# Patient Record
Sex: FEMALE | Race: WHITE | NOT HISPANIC OR LATINO | Employment: OTHER | URBAN - METROPOLITAN AREA
[De-identification: names, ages, dates, MRNs, and addresses within clinical notes are randomized per-mention and may not be internally consistent; named-entity substitution may affect disease eponyms.]

---

## 2017-01-17 ENCOUNTER — TRANSFERRED RECORDS (OUTPATIENT)
Dept: SURGERY | Facility: CLINIC | Age: 62
End: 2017-01-17

## 2017-06-07 ENCOUNTER — TELEPHONE (OUTPATIENT)
Dept: INTERNAL MEDICINE | Facility: CLINIC | Age: 62
End: 2017-06-07

## 2017-06-07 NOTE — TELEPHONE ENCOUNTER
Panel Management Review      Patient has the following on her problem list: None      Composite cancer screening  Chart review shows that this patient is due/due soon for the following Pap Smear and Colonoscopy  Summary:    Patient is due/failing the following:   COLONOSCOPY and PAP    Action needed:   Patient needs office visit for PX w/ PAP.    Type of outreach:    Phone, left message for patient to call back. to set that appt up    Questions for provider review:    None                                                                                                                                    Amy Baird CMA       Chart routed to [place] .

## 2017-07-03 NOTE — TELEPHONE ENCOUNTER
Pt informed due for px-pap and colonoscopy.    States she had pap on 10-18-16 with Dr. Herrera at [facility] and was normal/neg.    Has appt for colonoscopy 8-28-17.

## 2017-08-28 ENCOUNTER — HOSPITAL ENCOUNTER (OUTPATIENT)
Facility: CLINIC | Age: 62
Discharge: HOME | End: 2017-08-28
Attending: COLON & RECTAL SURGERY | Admitting: COLON & RECTAL SURGERY
Payer: COMMERCIAL

## 2017-08-28 ENCOUNTER — TRANSFERRED RECORDS (OUTPATIENT)
Dept: HEALTH INFORMATION MANAGEMENT | Facility: CLINIC | Age: 62
End: 2017-08-28

## 2017-08-28 VITALS
DIASTOLIC BLOOD PRESSURE: 73 MMHG | SYSTOLIC BLOOD PRESSURE: 115 MMHG | OXYGEN SATURATION: 95 % | WEIGHT: 130 LBS | HEIGHT: 62 IN | BODY MASS INDEX: 23.92 KG/M2 | RESPIRATION RATE: 10 BRPM

## 2017-08-28 DIAGNOSIS — Z12.11 SPECIAL SCREENING FOR MALIGNANT NEOPLASMS, COLON: Primary | ICD-10-CM

## 2017-08-28 LAB — COLONOSCOPY: NORMAL

## 2017-08-28 PROCEDURE — 25000128 H RX IP 250 OP 636: Performed by: COLON & RECTAL SURGERY

## 2017-08-28 PROCEDURE — 88305 TISSUE EXAM BY PATHOLOGIST: CPT | Mod: 26 | Performed by: COLON & RECTAL SURGERY

## 2017-08-28 PROCEDURE — G0500 MOD SEDAT ENDO SERVICE >5YRS: HCPCS | Performed by: COLON & RECTAL SURGERY

## 2017-08-28 PROCEDURE — 99153 MOD SED SAME PHYS/QHP EA: CPT | Performed by: COLON & RECTAL SURGERY

## 2017-08-28 PROCEDURE — 88305 TISSUE EXAM BY PATHOLOGIST: CPT | Performed by: COLON & RECTAL SURGERY

## 2017-08-28 PROCEDURE — 45380 COLONOSCOPY AND BIOPSY: CPT | Performed by: COLON & RECTAL SURGERY

## 2017-08-28 RX ORDER — FLUMAZENIL 0.1 MG/ML
0.2 INJECTION, SOLUTION INTRAVENOUS
Status: DISCONTINUED | OUTPATIENT
Start: 2017-08-28 | End: 2017-08-28 | Stop reason: HOSPADM

## 2017-08-28 RX ORDER — FENTANYL CITRATE 50 UG/ML
INJECTION, SOLUTION INTRAMUSCULAR; INTRAVENOUS PRN
Status: DISCONTINUED | OUTPATIENT
Start: 2017-08-28 | End: 2017-08-28 | Stop reason: HOSPADM

## 2017-08-28 RX ORDER — ONDANSETRON 4 MG/1
4-8 TABLET, ORALLY DISINTEGRATING ORAL EVERY 8 HOURS PRN
Qty: 10 TABLET | Refills: 0 | Status: SHIPPED | OUTPATIENT
Start: 2017-08-28 | End: 2020-11-19

## 2017-08-28 RX ORDER — LIDOCAINE 40 MG/G
CREAM TOPICAL
Status: DISCONTINUED | OUTPATIENT
Start: 2017-08-28 | End: 2017-08-28 | Stop reason: HOSPADM

## 2017-08-28 RX ORDER — ONDANSETRON 2 MG/ML
INJECTION INTRAMUSCULAR; INTRAVENOUS PRN
Status: DISCONTINUED | OUTPATIENT
Start: 2017-08-28 | End: 2017-08-28 | Stop reason: HOSPADM

## 2017-08-28 RX ORDER — NALOXONE HYDROCHLORIDE 0.4 MG/ML
.1-.4 INJECTION, SOLUTION INTRAMUSCULAR; INTRAVENOUS; SUBCUTANEOUS
Status: DISCONTINUED | OUTPATIENT
Start: 2017-08-28 | End: 2017-08-28 | Stop reason: HOSPADM

## 2017-08-28 RX ORDER — ONDANSETRON 2 MG/ML
4 INJECTION INTRAMUSCULAR; INTRAVENOUS
Status: DISCONTINUED | OUTPATIENT
Start: 2017-08-28 | End: 2017-08-28 | Stop reason: HOSPADM

## 2017-08-28 RX ORDER — ONDANSETRON 4 MG/1
4 TABLET, ORALLY DISINTEGRATING ORAL EVERY 6 HOURS PRN
Status: DISCONTINUED | OUTPATIENT
Start: 2017-08-28 | End: 2017-08-28 | Stop reason: HOSPADM

## 2017-08-28 RX ORDER — ONDANSETRON 2 MG/ML
4 INJECTION INTRAMUSCULAR; INTRAVENOUS EVERY 6 HOURS PRN
Status: DISCONTINUED | OUTPATIENT
Start: 2017-08-28 | End: 2017-08-28 | Stop reason: HOSPADM

## 2017-08-28 NOTE — OP NOTE
See Provation Note In Chart    Tiera Vazquez MD  [facility]  Office Phone # [phone]

## 2017-08-28 NOTE — H&P
Pre-Endoscopy History and Physical     Lorena Sanchez MRN# [id number]   YOB: 1955 Age: 61 year old     Date of Procedure: 8/28/2017  Primary care provider: Azalia Herrera  Type of Endoscopy: colonoscopy  Reason for Procedure: screening    Type of Anesthesia Anticipated: Moderate Sedation    HPI:    Lorena is a 61 year old female who will be undergoing the above procedure.      A history and physical has been performed. The patient's medications and allergies have been reviewed. The risks and benefits of the procedure and the sedation options and risks were discussed with the patient.  All questions were answered and informed consent was obtained.      She denies a personal or family history of anesthesia complications or bleeding disorders.     Allergies   Allergen Reactions     Penicillins Nausea and Vomiting     Sulfa Drugs Hives        Prior to Admission Medications   Prescriptions Last Dose Informant Patient Reported? Taking?   calcium carbonate (OS-JESUS 500 MG [place]. [place]) 500 MG tablet 8/27/2017  Yes Yes   Sig: Take 500 mg by mouth 2 times daily   multivitamin, therapeutic with minerals (MULTI-VITAMIN) TABS Past Week  Yes Yes   Sig: Take 1 tablet by mouth daily   valACYclovir (VALTREX) 1000 mg tablet More than a month  No No   Sig: Take 1 tablet (1,000 mg) by mouth 3 times daily   venlafaxine (EFFEXOR-XR) 75 MG 24 hr capsule 8/27/2017  Yes Yes   Sig: Take 75 mg by mouth daily      Facility-Administered Medications: None       Patient Active Problem List   Diagnosis     DCIS (ductal carcinoma in situ)        Past Medical History:   Diagnosis Date     DCIS (ductal carcinoma in situ) 03/12    lt breast      PONV (postoperative nausea and vomiting)         Past Surgical History:   Procedure Laterality Date     BIOPSY BREAST NEEDLE LOCALIZATION  3/16/2012    Procedure:BIOPSY BREAST NEEDLE LOCALIZATION; Left Breast Wire Localized Excisional biopsy; Surgeon:BEN RAWLS; Location:[facility]     BREAST BIOPSY,  "RT/LT      left     LUMPECTOMY BREAST Left        Social History   Substance Use Topics     Smoking status: Never Smoker     Smokeless tobacco: Never Used     Alcohol use No       Family History   Problem Relation Age of Onset     DIABETES Mother      Heart Failure Father      Parkinsonism Maternal Grandmother      Colon Cancer No family hx of        REVIEW OF SYSTEMS:     5 point ROS negative except as noted above in HPI, including Gen., Resp., CV, GI &  system review.      PHYSICAL EXAM:   /77  Resp 16  Ht 1.575 m (5' 2\")  Wt 59 kg (130 lb)  SpO2 97%  BMI 23.78 kg/m2 Estimated body mass index is 23.78 kg/(m^2) as calculated from the following:    Height as of this encounter: 1.575 m (5' 2\").    Weight as of this encounter: 59 kg (130 lb).   GENERAL APPEARANCE: healthy and alert  MENTAL STATUS: alert  AIRWAY EXAM: Mallampatti Class II (visualization of the soft palate, fauces, and uvula)  RESP: lungs clear to auscultation - no rales, rhonchi or wheezes  CV: regular rates and rhythm      DIAGNOSTICS:    Not indicated      IMPRESSION   ASA Class 2 - Mild systemic disease        PLAN:       Plan for colonoscopy. We discussed the risks, benefits and alternatives and the patient wished to proceed.    The above has been forwarded to the consulting provider.      Signed Electronically by: Tiera Vazquez MD  August 28, 2017    "

## 2017-08-29 LAB — COPATH REPORT: NORMAL

## 2017-09-15 ENCOUNTER — ALLIED HEALTH/NURSE VISIT (OUTPATIENT)
Dept: NURSING | Facility: CLINIC | Age: 62
End: 2017-09-15
Payer: COMMERCIAL

## 2017-09-15 DIAGNOSIS — Z23 ENCOUNTER FOR IMMUNIZATION: Primary | ICD-10-CM

## 2017-09-15 PROCEDURE — 90471 IMMUNIZATION ADMIN: CPT

## 2017-09-15 PROCEDURE — 99207 ZZC NO CHARGE NURSE ONLY: CPT

## 2017-09-15 PROCEDURE — 90736 HZV VACCINE LIVE SUBQ: CPT

## 2017-09-15 NOTE — MR AVS SNAPSHOT
"              After Visit Summary   9/15/2017    Lorena Sanchez    MRN: [id number]           Patient Information     Date Of Birth          1955        Visit Information        Provider Department      9/15/2017 3:30 PM RI IM NURSE [facility]        Today's Diagnoses     Encounter for immunization    -  1       Follow-ups after your visit        Who to contact     If you have questions or need follow up information about today's clinic visit or your schedule please contact [facility] directly at [phone].  Normal or non-critical lab and imaging results will be communicated to you by [employer], letter or phone within 4 business days after the clinic has received the results. If you do not hear from us within 7 days, please contact the clinic through [employer] or phone. If you have a critical or abnormal lab result, we will notify you by phone as soon as possible.  Submit refill requests through [employer] or call your pharmacy and they will forward the refill request to us. Please allow 3 business days for your refill to be completed.          Additional Information About Your Visit        MyChart Information     [employer] lets you send messages to your doctor, view your test results, renew your prescriptions, schedule appointments and more. To sign up, go to [email or web address] . Click on \"Log in\" on the left side of the screen, which will take you to the Welcome page. Then click on \"Sign up Now\" on the right side of the page.     You will be asked to enter the access code listed below, as well as some personal information. Please follow the directions to create your username and password.     Your access code is: [email or web address]  Expires: 2017  4:29 PM     Your access code will  in 90 days. If you need help or a new code, please call your [facility] or [phone].        Care EveryWhere ID     This is your Care EveryWhere ID. This could be used by other " organizations to access your [facility] medical records  [id number]         Blood Pressure from Last 3 Encounters:   08/28/17 115/73   08/15/16 118/70   12/07/15 114/78    Weight from Last 3 Encounters:   08/28/17 130 lb (59 kg)   08/15/16 135 lb (61.2 kg)   12/07/15 131 lb 1.6 oz (59.5 kg)              We Performed the Following     ZOSTER VACC LIVE SUBQ NJX        Primary Care Provider Office Phone # Fax #    Azalia JONATHAN Herrera [phone]       [facility] [address]  [place] [address]        Equal Access to Services     [facility]: Hadii aad nubia hadreeceo Socosme, waaxda luqadaha, qaybta kaalmada adeegyada, brianne philippe . So [facility] [phone].    ATENCIÓN: Si habla español, tiene a redding disposición servicios gratuitos de asistencia lingüística. Llame al [phone].    We comply with applicable federal civil rights laws and [place] laws. We do not discriminate on the basis of race, color, national origin, age, disability sex, sexual orientation or gender identity.            Thank you!     Thank you for choosing [facility]  for your care. Our goal is always to provide you with excellent care. Hearing back from our patients is one way we can continue to improve our services. Please take a few minutes to complete the written survey that you may receive in the mail after your visit with us. Thank you!             Your Updated Medication List - Protect others around you: Learn how to safely use, store and throw away your medicines at www.disposemymeds.org.          This list is accurate as of: 9/15/17  4:29 PM.  Always use your most recent med list.                   Brand Name Dispense Instructions for use Diagnosis    calcium carbonate 1250 MG tablet    OS-JESUS 500 mg [place]. [place]     Take 500 mg by mouth 2 times daily        Multi-vitamin Tabs tablet      Take 1 tablet by mouth daily        ondansetron 4 MG ODT tab    ZOFRAN ODT    10 tablet     Take 1-2 tablets (4-8 mg) by mouth every 8 hours as needed for nausea    Special screening for malignant neoplasms, colon       valACYclovir 1000 mg tablet    VALTREX    21 tablet    Take 1 tablet (1,000 mg) by mouth 3 times daily    Herpes zoster without complication       venlafaxine 75 MG 24 hr capsule    EFFEXOR-XR     Take 75 mg by mouth daily

## 2017-09-15 NOTE — NURSING NOTE
Screening Questionnaire for Adult Immunization    Are you sick today?   No   Do you have allergies to medications, food, a vaccine component or latex?   No   Have you ever had a serious reaction after receiving a vaccination?   No   Do you have a long-term health problem with heart disease, lung disease, asthma, kidney disease, metabolic disease (e.g. diabetes), anemia, or other blood disorder?   No   Do you have cancer, leukemia, HIV/AIDS, or any other immune system problem?   No   In the past 3 months, have you taken medications that affect  your immune system, such as prednisone, other steroids, or anticancer drugs; drugs for the treatment of rheumatoid arthritis, Crohn s disease, or psoriasis; or have you had radiation treatments?   No   Have you had a seizure, or a brain or other nervous system problem?   No   During the past year, have you received a transfusion of blood or blood     products, or been given immune (gamma) globulin or antiviral drug?   No   For women: Are you pregnant or is there a chance you could become        pregnant during the next month?   No   Have you received any vaccinations in the past 4 weeks?   No     Immunization questionnaire answers were all negative.        Per orders of Dr. Mcdowell, injection of Zostivax   given by Sharmila Parsons. Patient instructed to remain in clinic for 15 minutes afterwards, and to report any adverse reaction to me immediately.       Screening performed by Sharmila Parsons on 9/15/2017 at 4:27 PM.

## 2017-12-07 ENCOUNTER — HOSPITAL ENCOUNTER (OUTPATIENT)
Dept: MAMMOGRAPHY | Facility: CLINIC | Age: 62
Discharge: HOME | End: 2017-12-07
Attending: OBSTETRICS & GYNECOLOGY | Admitting: OBSTETRICS & GYNECOLOGY
Payer: COMMERCIAL

## 2017-12-07 ENCOUNTER — HOSPITAL ENCOUNTER (OUTPATIENT)
Dept: ULTRASOUND IMAGING | Facility: CLINIC | Age: 62
End: 2017-12-07
Attending: OBSTETRICS & GYNECOLOGY
Payer: COMMERCIAL

## 2017-12-07 DIAGNOSIS — N64.52 BREAST DISCHARGE: ICD-10-CM

## 2017-12-07 PROCEDURE — G0204 DX MAMMO INCL CAD BI: HCPCS

## 2017-12-07 PROCEDURE — 76642 ULTRASOUND BREAST LIMITED: CPT | Mod: RT

## 2017-12-20 ENCOUNTER — HOSPITAL ENCOUNTER (OUTPATIENT)
Dept: MRI IMAGING | Facility: CLINIC | Age: 62
Discharge: HOME | End: 2017-12-20
Attending: OBSTETRICS & GYNECOLOGY | Admitting: OBSTETRICS & GYNECOLOGY
Payer: COMMERCIAL

## 2017-12-20 DIAGNOSIS — R92.8 ABNORMAL MAMMOGRAM: ICD-10-CM

## 2017-12-20 PROCEDURE — A9585 GADOBUTROL INJECTION: HCPCS | Performed by: OBSTETRICS & GYNECOLOGY

## 2017-12-20 PROCEDURE — 0159T MR BREAST BILATERAL W/O & W CONTRAST: CPT

## 2017-12-20 PROCEDURE — 25000128 H RX IP 250 OP 636: Performed by: OBSTETRICS & GYNECOLOGY

## 2017-12-20 RX ORDER — GADOBUTROL 604.72 MG/ML
7.5 INJECTION INTRAVENOUS ONCE
Status: COMPLETED | OUTPATIENT
Start: 2017-12-20 | End: 2017-12-20

## 2017-12-20 RX ADMIN — GADOBUTROL 6 ML: 604.72 INJECTION INTRAVENOUS at 14:01

## 2017-12-21 ENCOUNTER — TELEPHONE (OUTPATIENT)
Dept: MAMMOGRAPHY | Facility: CLINIC | Age: 62
End: 2017-12-21

## 2017-12-21 NOTE — TELEPHONE ENCOUNTER
Patient notified of within normal limits breast MRI.  Instructed patient to have annual screening mammogram. Instructed patient to continue to monitor for nipple discharge, but not to try to express.  If she notices spontaneous discharge she should call navigator back and I will help her get an appointment to see a surgeon.  Patient states she has had no discharge for over 1 month and she will continue to monitor.  Informed her to speak with her PCP regarding future breast MRIs.

## 2018-10-05 ENCOUNTER — HOSPITAL ENCOUNTER (OUTPATIENT)
Dept: MAMMOGRAPHY | Facility: CLINIC | Age: 63
Discharge: HOME | End: 2018-10-05
Attending: OBSTETRICS & GYNECOLOGY | Admitting: OBSTETRICS & GYNECOLOGY
Payer: COMMERCIAL

## 2018-10-05 ENCOUNTER — HOSPITAL ENCOUNTER (OUTPATIENT)
Dept: ULTRASOUND IMAGING | Facility: CLINIC | Age: 63
End: 2018-10-05
Attending: OBSTETRICS & GYNECOLOGY
Payer: COMMERCIAL

## 2018-10-05 DIAGNOSIS — N63.20 LEFT BREAST LUMP: ICD-10-CM

## 2018-10-05 PROCEDURE — 77066 DX MAMMO INCL CAD BI: CPT

## 2018-10-05 PROCEDURE — 76642 ULTRASOUND BREAST LIMITED: CPT | Mod: LT

## 2019-03-25 LAB — PAP SMEAR - HIM PATIENT REPORTED: NEGATIVE

## 2019-10-11 ENCOUNTER — HOSPITAL ENCOUNTER (OUTPATIENT)
Dept: MAMMOGRAPHY | Facility: CLINIC | Age: 64
Discharge: HOME | End: 2019-10-11
Attending: OBSTETRICS & GYNECOLOGY | Admitting: OBSTETRICS & GYNECOLOGY
Payer: COMMERCIAL

## 2019-10-11 DIAGNOSIS — Z12.31 VISIT FOR SCREENING MAMMOGRAM: ICD-10-CM

## 2019-10-11 DIAGNOSIS — Z85.3 HISTORY OF BREAST CANCER: ICD-10-CM

## 2019-10-11 PROCEDURE — 77063 BREAST TOMOSYNTHESIS BI: CPT

## 2020-10-07 ENCOUNTER — TRANSFERRED RECORDS (OUTPATIENT)
Dept: FAMILY MEDICINE | Facility: CLINIC | Age: 65
End: 2020-10-07

## 2020-10-07 LAB
CHOLEST SERPL-MCNC: 233 MG/DL (ref 100–199)
CHOLEST/HDLC SERPL: 2.2 {RATIO} (ref 0–3.2)
HBA1C MFR BLD: 6 % (ref 4.8–5.6)
HDLC SERPL-MCNC: 52 MG/DL
LDLC SERPL CALC-MCNC: 116 MG/DL (ref 0–99)
T4 FREE SERPL-MCNC: 0.92 NG/DL (ref 0.82–1.77)
TRIGL SERPL-MCNC: 375 MG/DL (ref 0–149)
TSH SERPL-ACNC: 4.85 MCU/ML (ref 0.45–4.5)

## 2020-10-13 ENCOUNTER — HOSPITAL ENCOUNTER (OUTPATIENT)
Dept: ULTRASOUND IMAGING | Facility: CLINIC | Age: 65
End: 2020-10-13
Attending: OBSTETRICS & GYNECOLOGY
Payer: COMMERCIAL

## 2020-10-13 ENCOUNTER — HOSPITAL ENCOUNTER (OUTPATIENT)
Dept: MAMMOGRAPHY | Facility: CLINIC | Age: 65
End: 2020-10-13
Attending: OBSTETRICS & GYNECOLOGY
Payer: COMMERCIAL

## 2020-10-13 DIAGNOSIS — N64.52 NIPPLE DISCHARGE: ICD-10-CM

## 2020-10-13 DIAGNOSIS — Z11.59 ENCOUNTER FOR SCREENING FOR OTHER VIRAL DISEASES: Primary | ICD-10-CM

## 2020-10-13 PROCEDURE — 76642 ULTRASOUND BREAST LIMITED: CPT | Mod: RT

## 2020-10-13 PROCEDURE — G0279 TOMOSYNTHESIS, MAMMO: HCPCS

## 2020-10-30 DIAGNOSIS — Z11.59 ENCOUNTER FOR SCREENING FOR OTHER VIRAL DISEASES: ICD-10-CM

## 2020-10-30 PROCEDURE — U0003 INFECTIOUS AGENT DETECTION BY NUCLEIC ACID (DNA OR RNA); SEVERE ACUTE RESPIRATORY SYNDROME CORONAVIRUS 2 (SARS-COV-2) (CORONAVIRUS DISEASE [COVID-19]), AMPLIFIED PROBE TECHNIQUE, MAKING USE OF HIGH THROUGHPUT TECHNOLOGIES AS DESCRIBED BY CMS-2020-01-R: HCPCS | Performed by: OBSTETRICS & GYNECOLOGY

## 2020-10-31 LAB
SARS-COV-2 RNA SPEC QL NAA+PROBE: NOT DETECTED
SPECIMEN SOURCE: NORMAL

## 2020-11-03 ENCOUNTER — HOSPITAL ENCOUNTER (OUTPATIENT)
Dept: ULTRASOUND IMAGING | Facility: CLINIC | Age: 65
End: 2020-11-03
Attending: OBSTETRICS & GYNECOLOGY
Payer: MEDICARE

## 2020-11-03 ENCOUNTER — HOSPITAL ENCOUNTER (OUTPATIENT)
Dept: MAMMOGRAPHY | Facility: CLINIC | Age: 65
End: 2020-11-03
Attending: OBSTETRICS & GYNECOLOGY
Payer: MEDICARE

## 2020-11-03 DIAGNOSIS — N64.52 NIPPLE DISCHARGE: ICD-10-CM

## 2020-11-03 PROCEDURE — 272N000031 US BREAST BIOPSY CORE NEEDLE RIGHT

## 2020-11-03 PROCEDURE — 999N000065 MA POST PROCEDURE RIGHT

## 2020-11-03 PROCEDURE — 88305 TISSUE EXAM BY PATHOLOGIST: CPT | Mod: 26 | Performed by: PATHOLOGY

## 2020-11-03 PROCEDURE — 250N000009 HC RX 250: Performed by: RADIOLOGY

## 2020-11-03 PROCEDURE — 88305 TISSUE EXAM BY PATHOLOGIST: CPT | Mod: TC | Performed by: OBSTETRICS & GYNECOLOGY

## 2020-11-03 RX ORDER — INFLUENZA VIRUS VACCINE 15; 15; 15; 15 UG/.5ML; UG/.5ML; UG/.5ML; UG/.5ML
SUSPENSION INTRAMUSCULAR
COMMUNITY
Start: 2020-10-13 | End: 2020-11-13

## 2020-11-03 RX ORDER — FLUTICASONE PROPIONATE 50 MCG
2 SPRAY, SUSPENSION (ML) NASAL
COMMUNITY
Start: 2019-05-20 | End: 2020-11-13

## 2020-11-03 RX ORDER — CETIRIZINE HYDROCHLORIDE 10 MG/1
10 TABLET ORAL
COMMUNITY
Start: 2019-05-20

## 2020-11-03 RX ADMIN — LIDOCAINE HYDROCHLORIDE 5 ML: 10 INJECTION, SOLUTION EPIDURAL; INFILTRATION; INTRACAUDAL; PERINEURAL at 08:17

## 2020-11-03 NOTE — DISCHARGE INSTRUCTIONS
Page 1 of 1  For informational purposes only. Not to replace the advice of your health care provider. Copyright   2010 [facility]. All rights reserved. Isai [id number] - REV 02/16.  After Your Breast Biopsy   Bleeding or bruising  Slight bruising is normal. If you bleed through the bandage, put direct pressure on the breast for 10 minutes.   If the breast begins to swell, or you have a lot of bleeding after 10 minutes of pressure, call the doctor who ordered your exam. Or, go to the emergency room.   Bandages  Keep your bandage in place until tomorrow morning. Do not get it wet.   If you have small pieces of tape on the skin, leave them in place. They will fall off on their own, or you can remove them after 5 days.   Activity  You may shower the morning after the exam. No heavy activity (lifting, vacuuming) on the day of your exam. You may go back to normal activity the next day, unless you had a lot of bleeding or pain.  Discomfort  You may take Tylenol (acetaminophen) today for pain. Tomorrow, you may take an anti-inflammatory medicine (aspirin, ibuprofen, Motrin, Aleve, Advil), unless your doctor tells you not to.  Wear your bra overnight to support the breast. You may also use an ice pack: Place it over the area for 15-20 minutes several times a day.  Infection  Infection is rare. Symptoms include fever, redness, increasing pain and fluid draining from the biopsy site. If you have any of these symptoms, please call the doctor who ordered your exam.  Results  Results may take up to 5 business days. A nurse or doctor from the Breast Center will call with your results. We will also send the results to the doctor who ordered your biopsy.  If you have not heard your results in 5 days, please call the Breast Center.   Other instructions  ______________________________________________________________________________________________________________________________  Call your doctor if:    You have  bleeding that lasts more than 10 minutes.    You have pain that cannot be controlled.   You have signs of infection (fever, redness, drainage or other signs).   You have not received your results within 5 days.    Please call the Breast Center nurse navigator at [phone] if you have questions or concerns about your biopsy.

## 2020-11-04 ENCOUNTER — TELEPHONE (OUTPATIENT)
Dept: MAMMOGRAPHY | Facility: CLINIC | Age: 65
End: 2020-11-04

## 2020-11-04 LAB — COPATH REPORT: NORMAL

## 2020-11-04 NOTE — TELEPHONE ENCOUNTER
Pathology report reviewed with our breast radiologist Dr Damon, who confirmed the recent breast imaging is concordant with the final pathology results below.    I phoned Ms Sanchez, confirmed her full name, date of birth, and informed patient of her ultrasound Guided right Breast Needle Biopsy (11/03/2020) results showing benign Nonproliferative appearing breast tissue without specific lesion.   - Negative for atypia and malignancy.   - See microscopic description.      Recommended follow up is stereotactic right breast biopsy. I have explained this procedure and that I will contact Dr Herrera for additional orders. She will then be contacted by scheduling department. I have also provided that phone number in the event she chooses to call them directly.      Patient states no problems or concerns with her biopsy site.   Questions were answered and my phone number given if she has further questions or concerns.  I informed patient I will notify the ordering provider of the results and recommendations for follow up.  Patient verbalized understanding and agrees with the plan of care.     Neva Ni RN CBCN  Breast Care Nurse Coordinator  [facility]  [phone]

## 2020-11-06 DIAGNOSIS — R92.1 BREAST CALCIFICATIONS: ICD-10-CM

## 2020-11-06 DIAGNOSIS — Z11.59 ENCOUNTER FOR SCREENING FOR OTHER VIRAL DISEASES: Primary | ICD-10-CM

## 2020-11-10 DIAGNOSIS — Z11.59 ENCOUNTER FOR SCREENING FOR OTHER VIRAL DISEASES: ICD-10-CM

## 2020-11-10 PROCEDURE — U0003 INFECTIOUS AGENT DETECTION BY NUCLEIC ACID (DNA OR RNA); SEVERE ACUTE RESPIRATORY SYNDROME CORONAVIRUS 2 (SARS-COV-2) (CORONAVIRUS DISEASE [COVID-19]), AMPLIFIED PROBE TECHNIQUE, MAKING USE OF HIGH THROUGHPUT TECHNOLOGIES AS DESCRIBED BY CMS-2020-01-R: HCPCS | Performed by: OBSTETRICS & GYNECOLOGY

## 2020-11-11 LAB
SARS-COV-2 RNA SPEC QL NAA+PROBE: NOT DETECTED
SPECIMEN SOURCE: NORMAL

## 2020-11-13 ENCOUNTER — HOSPITAL ENCOUNTER (OUTPATIENT)
Dept: MAMMOGRAPHY | Facility: CLINIC | Age: 65
End: 2020-11-13
Attending: OBSTETRICS & GYNECOLOGY
Payer: MEDICARE

## 2020-11-13 ENCOUNTER — HOSPITAL ENCOUNTER (OUTPATIENT)
Dept: ULTRASOUND IMAGING | Facility: CLINIC | Age: 65
End: 2020-11-13
Attending: OBSTETRICS & GYNECOLOGY
Payer: MEDICARE

## 2020-11-13 DIAGNOSIS — R92.1 BREAST CALCIFICATIONS: ICD-10-CM

## 2020-11-13 PROCEDURE — 76642 ULTRASOUND BREAST LIMITED: CPT | Mod: RT

## 2020-11-13 PROCEDURE — 19081 BX BREAST 1ST LESION STRTCTC: CPT | Mod: RT

## 2020-11-13 PROCEDURE — 250N000009 HC RX 250: Performed by: OBSTETRICS & GYNECOLOGY

## 2020-11-13 PROCEDURE — 19081 BX BREAST 1ST LESION STRTCTC: CPT | Mod: 73

## 2020-11-13 PROCEDURE — 88305 TISSUE EXAM BY PATHOLOGIST: CPT | Mod: 26 | Performed by: PATHOLOGY

## 2020-11-13 PROCEDURE — 999N000065 MA POST PROCEDURE RIGHT

## 2020-11-13 PROCEDURE — 88305 TISSUE EXAM BY PATHOLOGIST: CPT | Mod: TC | Performed by: OBSTETRICS & GYNECOLOGY

## 2020-11-13 RX ORDER — LIDOCAINE HYDROCHLORIDE AND EPINEPHRINE 10; 10 MG/ML; UG/ML
1 INJECTION, SOLUTION INFILTRATION; PERINEURAL ONCE
Status: DISCONTINUED | OUTPATIENT
Start: 2020-11-13 | End: 2020-11-14 | Stop reason: HOSPADM

## 2020-11-13 RX ORDER — LEVOCETIRIZINE DIHYDROCHLORIDE 5 MG/1
5 TABLET, FILM COATED ORAL
COMMUNITY
Start: 2019-05-20 | End: 2020-11-19

## 2020-11-13 RX ORDER — MOMETASONE FUROATE MONOHYDRATE 50 UG/1
2 SPRAY, METERED NASAL
COMMUNITY
Start: 2019-05-20

## 2020-11-13 RX ORDER — LIDOCAINE HYDROCHLORIDE AND EPINEPHRINE 10; 10 MG/ML; UG/ML
20 INJECTION, SOLUTION INFILTRATION; PERINEURAL ONCE
Status: COMPLETED | OUTPATIENT
Start: 2020-11-13 | End: 2020-11-13

## 2020-11-13 RX ORDER — LIDOCAINE HYDROCHLORIDE 10 MG/ML
1 INJECTION, SOLUTION EPIDURAL; INFILTRATION; INTRACAUDAL; PERINEURAL ONCE
Status: DISCONTINUED | OUTPATIENT
Start: 2020-11-13 | End: 2020-11-14 | Stop reason: HOSPADM

## 2020-11-13 RX ORDER — VENLAFAXINE HYDROCHLORIDE 75 MG/1
CAPSULE, EXTENDED RELEASE ORAL
COMMUNITY
Start: 2019-05-14 | End: 2021-02-05 | Stop reason: DRUGHIGH

## 2020-11-13 RX ORDER — OFLOXACIN 3 MG/ML
1-2 SOLUTION/ DROPS OPHTHALMIC
COMMUNITY
Start: 2019-06-28 | End: 2020-11-19

## 2020-11-13 RX ADMIN — LIDOCAINE HYDROCHLORIDE AND EPINEPHRINE 20 ML: 10; 10 INJECTION, SOLUTION INFILTRATION; PERINEURAL at 13:17

## 2020-11-13 RX ADMIN — LIDOCAINE HYDROCHLORIDE 5 ML: 10 INJECTION, SOLUTION INFILTRATION; PERINEURAL at 13:17

## 2020-11-13 NOTE — PROGRESS NOTES
Due to positioning patient will go to [facility] [place] for up right stereotatic breast biopsy. This has been scheduled for later today.

## 2020-11-13 NOTE — DISCHARGE INSTRUCTIONS

## 2020-11-16 ENCOUNTER — TELEPHONE (OUTPATIENT)
Dept: MAMMOGRAPHY | Facility: CLINIC | Age: 65
End: 2020-11-16

## 2020-11-16 LAB — COPATH REPORT: NORMAL

## 2020-11-16 NOTE — TELEPHONE ENCOUNTER
After review by Breast Center Radiologist, Dr. Wilder Damon, Ms. Sanchez was called and given her 11/13/20 Right Breast Biopsy results (Sclerotic Fibroadenoma) and recommended Follow up (Annual Screen).  Biopsy site without issues or concerns.   I encouraged her to contact her doctor with any further breast concerns.

## 2020-11-19 ENCOUNTER — OFFICE VISIT (OUTPATIENT)
Dept: FAMILY MEDICINE | Facility: CLINIC | Age: 65
End: 2020-11-19

## 2020-11-19 VITALS
HEART RATE: 80 BPM | HEIGHT: 61 IN | WEIGHT: 139.2 LBS | TEMPERATURE: 98.1 F | DIASTOLIC BLOOD PRESSURE: 76 MMHG | BODY MASS INDEX: 26.28 KG/M2 | SYSTOLIC BLOOD PRESSURE: 120 MMHG | OXYGEN SATURATION: 94 %

## 2020-11-19 DIAGNOSIS — R79.89 ELEVATED LFTS: ICD-10-CM

## 2020-11-19 DIAGNOSIS — E03.8 SUBCLINICAL HYPOTHYROIDISM: ICD-10-CM

## 2020-11-19 DIAGNOSIS — Z86.000 HISTORY OF DUCTAL CARCINOMA IN SITU (DCIS) OF BREAST: ICD-10-CM

## 2020-11-19 DIAGNOSIS — Z00.00 MEDICARE WELCOME VISIT: Primary | ICD-10-CM

## 2020-11-19 PROBLEM — Z76.89 HEALTH CARE HOME: Status: ACTIVE | Noted: 2020-11-19

## 2020-11-19 PROBLEM — J30.2 ALLERGIC RHINITIS, SEASONAL: Status: ACTIVE | Noted: 2020-11-19

## 2020-11-19 PROBLEM — Z71.89 ACP (ADVANCE CARE PLANNING): Status: ACTIVE | Noted: 2020-11-19

## 2020-11-19 LAB
ERYTHROCYTE [DISTWIDTH] IN BLOOD BY AUTOMATED COUNT: 13.2 %
HCT VFR BLD AUTO: 43.4 % (ref 35–47)
HEMOGLOBIN: 14.3 G/DL (ref 11.7–15.7)
MCH RBC QN AUTO: 29.1 PG (ref 26–33)
MCHC RBC AUTO-ENTMCNC: 32.9 G/DL (ref 31–36)
MCV RBC AUTO: 88.2 FL (ref 78–100)
PLATELET COUNT - QUEST: 264 10^9/L (ref 150–375)
RBC # BLD AUTO: 4.92 10*12/L (ref 3.8–5.2)
WBC # BLD AUTO: 7.4 10*9/L (ref 4–11)

## 2020-11-19 PROCEDURE — 80076 HEPATIC FUNCTION PANEL: CPT | Performed by: PHYSICIAN ASSISTANT

## 2020-11-19 PROCEDURE — 90471 IMMUNIZATION ADMIN: CPT | Performed by: PHYSICIAN ASSISTANT

## 2020-11-19 PROCEDURE — 85027 COMPLETE CBC AUTOMATED: CPT | Performed by: PHYSICIAN ASSISTANT

## 2020-11-19 PROCEDURE — G0402 INITIAL PREVENTIVE EXAM: HCPCS | Mod: 25 | Performed by: PHYSICIAN ASSISTANT

## 2020-11-19 PROCEDURE — 90732 PPSV23 VACC 2 YRS+ SUBQ/IM: CPT | Performed by: PHYSICIAN ASSISTANT

## 2020-11-19 PROCEDURE — 36415 COLL VENOUS BLD VENIPUNCTURE: CPT | Performed by: PHYSICIAN ASSISTANT

## 2020-11-19 ASSESSMENT — MIFFLIN-ST. JEOR: SCORE: 1117.75

## 2020-11-19 NOTE — LETTER
[facility]  [address]  [address]  [facility] [address]  [phone]      November 19, 2020      Lorena Sanchez  [address] [facility] [address]      EMERGENCY CARE PLAN  Presenting Problem Treatment Plan   Questions or concerns during clinic hours I will call the clinic directly:    [facility]  [address] , [address]  [place] [address] [phone]   Questions or concerns outside clinic hours  I will call the 24 hour line at [phone]   Patient needs to schedule an appointment  I will call the  scheduling line at [phone]   Same day treatment   I will call the clinic first, then  urgent care and/or  express care if needed   Clinic Care Coordinators Sol Messer RN:  [phone]  [facility] Clinical Support Staff: [phone]    Crisis Services:  Behavioral or Mental Health P (Behavioral Health Providers)   [phone]   Emergency treatment--Immediately CALL 211

## 2020-11-19 NOTE — PROGRESS NOTES
Lorena Sanchez is a 65 year old female who presents for Medicare Annual Wellness Visit.    Current providers caring for this patient include:  Patient Care Team:  Kerry Marti PA as PCP - General (Family Practice)    Complete Medical and Social history reviewed with patient, outlined below.          Patient Active Problem List   Diagnosis     DCIS (ductal carcinoma in situ)     Allergic rhinitis, seasonal     Breast cancer, left breast (H)     ACP (advance care planning)     Health Care Home       Past Medical History:   Diagnosis Date     DCIS (ductal carcinoma in situ) 03/01/2012    lt breast      Pneumonia due to infectious organism     early 2000s     PONV (postoperative nausea and vomiting)        Past Surgical History:   Procedure Laterality Date     BIOPSY BREAST NEEDLE LOCALIZATION  03/16/2012    Procedure:BIOPSY BREAST NEEDLE LOCALIZATION; Left Breast Wire Localized Excisional biopsy; Surgeon:BEN RAWLS; Location:[facility]     BREAST BIOPSY, RT/LT      left     COLONOSCOPY N/A 08/28/2017    Procedure: COMBINED COLONOSCOPY, SINGLE OR MULTIPLE BIOPSY/POLYPECTOMY BY BIOPSY;  colonoscopy with polypectomy by cold jumbo forcep;  Surgeon: Tiera Vazquez MD;  Location: [facility]     LUMPECTOMY BREAST Left 2012       Family History   Problem Relation Age of Onset     Diabetes Type 2  Mother      Heart Failure Father      Hyperlipidemia Father      Cardiac Sudden Death Father      Parkinsonism Maternal Grandmother      Hyperlipidemia Brother      Prostate Cancer Brother 60     No Known Problems Brother      No Known Problems Brother      Asthma Daughter      Colon Cancer No family hx of        Social History     Tobacco Use     Smoking status: Never Smoker     Smokeless tobacco: Never Used   Substance Use Topics     Alcohol use: No       Diet: regular, low salt/low fat  Physical Activity: active without specific exercise program - Walking in summer  Depression Screen:    Over the past 2 weeks,  patient has felt down, depressed, or hopeless:  No    Over the past 2 weeks, patient has felt little interest or pleasure in doing things: No    Functional ability/Safety screen:  Up and go test (able to get up and walk longer than 30 seconds): Passed  Patient needs assistance with: nothing  Patient's home has the following possible safety concerns: none identified  Patient has concerns about her hearing:  No     Cognitive Screen  Patient repeats three objects (ball, flag, tree)      Clock drawing test:  NORMAL  Recalls three objects after 3 minutes (ball,flag,tree):                                                                                               recalls 2 objects (2 points)      Labs pending for elevated LFTs  Subclinical Hypothyroidism - recheck this and  Lipids in [facility]      Pt is new to our clinic.    I have reviewed the following histories: Past Medical History, Past Surgical History, Social History, Family History, Problem List, Medication List and Allergies

## 2020-11-20 ENCOUNTER — TRANSFERRED RECORDS (OUTPATIENT)
Dept: FAMILY MEDICINE | Facility: CLINIC | Age: 65
End: 2020-11-20

## 2020-11-20 LAB
ALBUMIN SERPL-MCNC: 4.9 G/DL (ref 3.6–5.1)
ALP SERPL-CCNC: 80 U/L (ref 33–130)
ALT 1742-6: 35 U/L (ref 0–32)
AST 1920-8: 19 U/L (ref 0–35)
BILIRUB SERPL-MCNC: 0.5 MG/DL (ref 0.2–1.2)
BILIRUBIN DIRECT: 0.2 MG/DL (ref 0.1–0.4)
PROT SERPL-MCNC: 7.4 G/DL (ref 6.1–8.1)

## 2020-11-21 LAB
% SATURATION - QUEST: 21 % (CALC) (ref 16–45)
FERRITIN SERPL-MCNC: 113 NG/ML (ref 16–288)
GGT SERPL-CCNC: 167 U/L (ref 3–65)
HB CORE AB - QUEST: NORMAL
HB S AG - QUEST: NORMAL
HCV AB - QUEST: NORMAL
HEP B SURFACE ABY QUANT: 45 MIU/ML
HIV 1/2 AGN ABY 4TH GEN WITH REFLEX: NORMAL
IRON: 74 MCG/DL (ref 45–160)
SIGNAL TO CUT OFF - QUEST: 0.01
TIBC - QUEST: 356 MCG/DL (CALC) (ref 250–450)

## 2020-12-17 ENCOUNTER — TRANSFERRED RECORDS (OUTPATIENT)
Dept: FAMILY MEDICINE | Facility: CLINIC | Age: 65
End: 2020-12-17

## 2021-02-05 ENCOUNTER — TELEPHONE (OUTPATIENT)
Dept: FAMILY MEDICINE | Facility: CLINIC | Age: 66
End: 2021-02-05

## 2021-02-05 ENCOUNTER — MYC MEDICAL ADVICE (OUTPATIENT)
Dept: FAMILY MEDICINE | Facility: CLINIC | Age: 66
End: 2021-02-05

## 2021-02-05 DIAGNOSIS — N95.1 MENOPAUSAL SYNDROME (HOT FLASHES): Primary | ICD-10-CM

## 2021-02-05 NOTE — TELEPHONE ENCOUNTER
Pt states in Nov you told her to stop taking the 75 mg of venlafaxine and take 37.5 mg instead, she called requesting an RX for that dose. I did not see anything in your note about this and it was a  Welcome to medicare visit. Please advise Thanks. I assume pt needs OV.

## 2021-02-05 NOTE — TELEPHONE ENCOUNTER
GABRIELATCB    Does she take this for hot flashes, depression or anxiety?    Kerry Marti PA-C  2/5/2021

## 2021-02-08 RX ORDER — VENLAFAXINE HYDROCHLORIDE 37.5 MG/1
37.5 CAPSULE, EXTENDED RELEASE ORAL DAILY
Qty: 90 CAPSULE | Refills: 3 | Status: SHIPPED | OUTPATIENT
Start: 2021-02-08 | End: 2021-11-10

## 2021-06-30 ENCOUNTER — TELEPHONE (OUTPATIENT)
Dept: FAMILY MEDICINE | Facility: CLINIC | Age: 66
End: 2021-06-30

## 2021-06-30 DIAGNOSIS — E03.8 SUBCLINICAL HYPOTHYROIDISM: ICD-10-CM

## 2021-06-30 DIAGNOSIS — R73.01 ELEVATED FASTING GLUCOSE: ICD-10-CM

## 2021-06-30 DIAGNOSIS — E78.2 MIXED HYPERLIPIDEMIA: ICD-10-CM

## 2021-06-30 DIAGNOSIS — R79.89 ELEVATED LFTS: Primary | ICD-10-CM

## 2021-06-30 NOTE — TELEPHONE ENCOUNTER
Pt called the lab thinking she is supposed to come back to recheck labs.  I don't see any notes or future orders.  Does pt need OV?  Please advise.

## 2021-07-01 PROBLEM — R73.01 ELEVATED FASTING GLUCOSE: Status: ACTIVE | Noted: 2021-07-01

## 2021-07-01 PROBLEM — E78.2 MIXED HYPERLIPIDEMIA: Status: ACTIVE | Noted: 2021-07-01

## 2021-07-02 DIAGNOSIS — E03.8 SUBCLINICAL HYPOTHYROIDISM: ICD-10-CM

## 2021-07-02 DIAGNOSIS — E78.2 MIXED HYPERLIPIDEMIA: ICD-10-CM

## 2021-07-02 DIAGNOSIS — R79.89 ELEVATED LFTS: ICD-10-CM

## 2021-07-02 DIAGNOSIS — R73.01 ELEVATED FASTING GLUCOSE: ICD-10-CM

## 2021-07-02 LAB
ALBUMIN SERPL-MCNC: 4.5 G/DL (ref 3.6–5.1)
ALBUMIN/GLOB SERPL: 1.7 {RATIO} (ref 1–2.5)
ALP SERPL-CCNC: 86 U/L (ref 33–130)
ALT 1742-6: 25 U/L (ref 0–32)
AST 1920-8: 14 U/L (ref 0–35)
BILIRUB SERPL-MCNC: 0.6 MG/DL (ref 0.2–1.2)
BUN SERPL-MCNC: 14 MG/DL (ref 7–25)
BUN/CREATININE RATIO: 17.1 (ref 6–22)
CALCIUM SERPL-MCNC: 9.9 MG/DL (ref 8.6–10.3)
CHLORIDE SERPLBLD-SCNC: 102.1 MMOL/L (ref 98–110)
CHOLEST SERPL-MCNC: 262 MG/DL (ref 0–199)
CHOLEST/HDLC SERPL: 5 {RATIO} (ref 0–5)
CO2 SERPL-SCNC: 30 MMOL/L (ref 20–32)
CREAT SERPL-MCNC: 0.82 MG/DL (ref 0.6–1.3)
GLOBULIN, CALCULATED - QUEST: 2.6 (ref 1.9–3.7)
GLUCOSE SERPL-MCNC: 102 MG/DL (ref 60–99)
HBA1C MFR BLD: 6 % (ref 4–7)
HDLC SERPL-MCNC: 55 MG/DL (ref 40–150)
LDLC SERPL CALC-MCNC: 152 MG/DL (ref 0–130)
POTASSIUM SERPL-SCNC: 4.57 MMOL/L (ref 3.5–5.3)
PROT SERPL-MCNC: 7.1 G/DL (ref 6.1–8.1)
SODIUM SERPL-SCNC: 138.5 MMOL/L (ref 135–146)
TRIGL SERPL-MCNC: 277 MG/DL (ref 0–149)

## 2021-07-02 PROCEDURE — 83036 HEMOGLOBIN GLYCOSYLATED A1C: CPT | Performed by: PHYSICIAN ASSISTANT

## 2021-07-02 PROCEDURE — 36415 COLL VENOUS BLD VENIPUNCTURE: CPT | Performed by: PHYSICIAN ASSISTANT

## 2021-07-02 PROCEDURE — 80053 COMPREHEN METABOLIC PANEL: CPT | Performed by: PHYSICIAN ASSISTANT

## 2021-07-02 PROCEDURE — 80061 LIPID PANEL: CPT | Performed by: PHYSICIAN ASSISTANT

## 2021-07-03 LAB
T4, FREE, NON-DIALYSIS - QUEST: 1 NG/DL (ref 0.8–1.8)
TSH SERPL-ACNC: 4.61 MIU/L (ref 0.4–4.5)

## 2021-09-04 ENCOUNTER — HEALTH MAINTENANCE LETTER (OUTPATIENT)
Age: 66
End: 2021-09-04

## 2021-10-04 ENCOUNTER — OFFICE VISIT (OUTPATIENT)
Dept: FAMILY MEDICINE | Facility: CLINIC | Age: 66
End: 2021-10-04

## 2021-10-04 VITALS
HEART RATE: 86 BPM | BODY MASS INDEX: 25.86 KG/M2 | DIASTOLIC BLOOD PRESSURE: 78 MMHG | RESPIRATION RATE: 22 BRPM | TEMPERATURE: 98.1 F | WEIGHT: 138 LBS | OXYGEN SATURATION: 98 % | SYSTOLIC BLOOD PRESSURE: 148 MMHG

## 2021-10-04 DIAGNOSIS — S43.004D DISLOCATION OF RIGHT SHOULDER JOINT, SUBSEQUENT ENCOUNTER: Primary | ICD-10-CM

## 2021-10-04 PROCEDURE — 73030 X-RAY EXAM OF SHOULDER: CPT | Mod: RT | Performed by: STUDENT IN AN ORGANIZED HEALTH CARE EDUCATION/TRAINING PROGRAM

## 2021-10-04 PROCEDURE — 99215 OFFICE O/P EST HI 40 MIN: CPT | Performed by: STUDENT IN AN ORGANIZED HEALTH CARE EDUCATION/TRAINING PROGRAM

## 2021-10-04 PROCEDURE — A4565 SLINGS: HCPCS | Performed by: STUDENT IN AN ORGANIZED HEALTH CARE EDUCATION/TRAINING PROGRAM

## 2021-10-04 RX ORDER — ONDANSETRON 8 MG/1
8 TABLET, ORALLY DISINTEGRATING ORAL
COMMUNITY
Start: 2021-10-03 | End: 2021-11-10

## 2021-10-04 NOTE — PROGRESS NOTES
Assessment & Plan     Dislocation of right shoulder joint, subsequent encounter  Only 1 view post-reduction xray in ED, 3v obtained today and confirm anatomic alignment without any obvious fracture. Begin gentle ROM, sling for comfort, PT. Close follow-up to reassess strength and function, decide on additional imaging at that time.   - XR Shoulder Right G/E 3 Views  - Physical Therapy Referral    Patient Instructions   Sling for comfort, gentle range of motion when at home. Wear at night for another few nights.  Tylenol/ibuprofen as needed for pain    No lifting > 5 lbs with right arm    Schedule Physical Therapy @ [facility]   [address] [place] [address]  [phone] -- appt line    Follow-up with me in 2-3 weeks, sooner with any concerns        50 minutes spent on the date of the encounter doing chart review, history and exam, documentation and further activities per the note.    Darien Quiñonez MD, [facility]        Subjective   Lorena is a 65 year old who presents for the following health issues  accompanied by her daughter:    HPI     ED/UC Followup:    Facility:  [facility] ER   Date of visit: 10/3/2021  Reason for visit: Fall that resulted in dislocated right shoulder   Current Status: Doing better,  Does not feel a lot of pain at all just more discomfort, does have arm sling. Mechanical fall going up stairs, no prior falls. No other injuries. No pain meds needed today. Nausea from opioids in ED resolved. No n/t.          Objective    LMP 12/10/2011   There is no height or weight on file to calculate BMI.  Physical Exam   Alert, NAD  NC/AT  Sclerae anicteric  Regular  Resp nonlabored  Skin warm and dry  No focal neuro deficits. Speech intact. Normal gait.  Appropriate affect    R shoulder without acute deformity, skin unremarkable, no ecchymosis  AROM 80 deg abduction, 40 deg ER  Deltoid activation intact.   SILT throughout RUE  Distal CMS  intact    IMAGING  XR Shoulder Right G/E 3 Views    Result Date: 10/4/2021  [facility] Phone: [phone] * Fax: [phone] [address], [place] [address] [place] [address] Age: 65 Y Dept No.: [id number] ODNNAQING HALL : 1955 Chart # Gender: F Req. Phys: Kerry Marti PA Clinic MRN: Clinic: [facility] Acc#: [id number] Exam: SHOULDER 3 VIEWS / RT Exam Date: 10/04/2021 EXAM: SHOULDER 3 VIEWS RIGHT LOCATION: [facility] DATE/TIME: 10/4/2021 2:25 PM INDICATION: Dislocation of right shoulder. COMPARISON: None. IMPRESSION: No acute fracture or malalignment. No significant degenerative changes. Osteopenia. Performed by: SKYLAR Transcribed By: JERSON on: 10/04/2021 2:37 PM CDT Finalized By: DORA REICH M.D. on: 10/04/2021 2:37 PM CDT Dictated By: DORA REICH M.D. Signed by: SIOBHAN Page 1 of 1

## 2021-10-04 NOTE — NURSING NOTE
Chief Complaint   Patient presents with     Hospital F/U     ER Follow up, dislocation of right shoulder, seen yesterday 10/3/21, [facility]     Pre-visit Screening:  Immunizations:  up to date  Colonoscopy:  is up to date  Mammogram: is up to date  Asthma Action Test/Plan:  NA  PHQ9:  PHQ-2 done today   GAD7:  No concerns  Questioned patient about current smoking habits Pt. has never smoked.  Ok to leave detailed message on voice mail for today's visit only Yes, phone # [phone]

## 2021-10-04 NOTE — PATIENT INSTRUCTIONS
Sling for comfort, gentle range of motion when at home. Wear at night for another few nights.  Tylenol/ibuprofen as needed for pain    No lifting > 5 lbs with right arm    Schedule Physical Therapy @ [facility]   [address] [place] [id number] [phone] -- appt line    Follow-up with me in 2-3 weeks, sooner with any concerns

## 2021-10-11 ENCOUNTER — TELEPHONE (OUTPATIENT)
Dept: FAMILY MEDICINE | Facility: CLINIC | Age: 66
End: 2021-10-11

## 2021-10-11 NOTE — TELEPHONE ENCOUNTER
Forms from [employer] [facility] in your in-basket regarding pt's right shoulder dislocation.    Please review and sign.    Thanks, Gertrude GARLAND

## 2021-10-18 ENCOUNTER — OFFICE VISIT (OUTPATIENT)
Dept: FAMILY MEDICINE | Facility: CLINIC | Age: 66
End: 2021-10-18

## 2021-10-18 VITALS
DIASTOLIC BLOOD PRESSURE: 84 MMHG | WEIGHT: 139 LBS | HEART RATE: 83 BPM | SYSTOLIC BLOOD PRESSURE: 128 MMHG | HEIGHT: 62 IN | RESPIRATION RATE: 22 BRPM | OXYGEN SATURATION: 100 % | BODY MASS INDEX: 25.58 KG/M2

## 2021-10-18 DIAGNOSIS — S43.004D DISLOCATION OF RIGHT SHOULDER JOINT, SUBSEQUENT ENCOUNTER: Primary | ICD-10-CM

## 2021-10-18 PROCEDURE — 90662 IIV NO PRSV INCREASED AG IM: CPT | Performed by: STUDENT IN AN ORGANIZED HEALTH CARE EDUCATION/TRAINING PROGRAM

## 2021-10-18 PROCEDURE — 99214 OFFICE O/P EST MOD 30 MIN: CPT | Mod: 25 | Performed by: STUDENT IN AN ORGANIZED HEALTH CARE EDUCATION/TRAINING PROGRAM

## 2021-10-18 PROCEDURE — G0008 ADMIN INFLUENZA VIRUS VAC: HCPCS | Performed by: STUDENT IN AN ORGANIZED HEALTH CARE EDUCATION/TRAINING PROGRAM

## 2021-10-18 ASSESSMENT — MIFFLIN-ST. JEOR: SCORE: 1120.81

## 2021-10-18 NOTE — PATIENT INSTRUCTIONS
Continue working with PT, you're already making some good progress!    Follow up again in 2 weeks, we can decide if we should get MRI at that time. Come back sooner with any concerns.

## 2021-10-18 NOTE — NURSING NOTE
Chief Complaint   Patient presents with     RECHECK     right shoulder, notes improvement.      Flu Shot     high dose     PVS is UTD

## 2021-10-18 NOTE — PROGRESS NOTES
"ASSESSMENT & PLAN    1. Dislocation of right shoulder joint, subsequent encounter  Pain and ROM improving, making good progress. PT notes from [facility] reviewed. Again reviewed potential RTC injury given age, agreed to reassess progress after another two weeks of therapy and make decision whether to pursue MRI at that time. Ok to sleep without sling, only recommend wearing when out in public as needed for comfort/protection. Letter for work provided.      Patient Instructions   Continue working with PT, you're already making some good progress!    Follow up again in 2 weeks, we can decide if we should get MRI at that time. Come back sooner with any concerns.        35 minutes spent on the date of the encounter doing chart review, history and exam, documentation and further activities per the note.    Darien Quiñonez MD, [facility]    -----    SUBJECTIVE:  Lorena Sanchez is a 65 year old female who is seen in follow-up for right shoulder dislocation.They were last seen 10/4/2021.     Since their last visit reports some improvement since last visit. Began PT one week ago and doing home exercises daily. No new sx or concerns.        The patient is seen by themselves.    Patient's past medical, surgical, social, and family histories were reviewed today and no changes are noted.    OBJECTIVE:  /84 (BP Location: Left arm, Patient Position: Sitting, Cuff Size: Adult Regular)   Pulse 83   Resp 22   Ht 1.562 m (5' 1.5\")   Wt 63 kg (139 lb)   LMP 12/10/2011   SpO2 100%   BMI 25.84 kg/m     General: healthy, alert and in no distress  HEENT: no scleral icterus or conjunctival erythema  Skin: no suspicious lesions or rash.   Resp: normal respiratory effort without conversational dyspnea   Psych: normal mood and affect  Gait: normal steady gait     MSK:  AROM 110 abd/100 flex/70 ER  Deltoid activation intact.   Strength intact in abd/er/ir  Mild pain with empty can testing but strength intact  SILT " throughout RUE  Distal CMS intact    Imaging:  No new imaging today

## 2021-10-18 NOTE — LETTER
October 18, 2021      Lorena Sanchez  [address] [id number]        To Whom It May Concern:    Lorena Sanchez  was seen in clinic today.  Please excuse her until 11/15/21 due to injury. We will reevaluate any further restrictions at that time.      Sincerely,        Darien Quiñonez MD

## 2021-11-01 ENCOUNTER — TELEPHONE (OUTPATIENT)
Dept: FAMILY MEDICINE | Facility: CLINIC | Age: 66
End: 2021-11-01

## 2021-11-01 ENCOUNTER — OFFICE VISIT (OUTPATIENT)
Dept: FAMILY MEDICINE | Facility: CLINIC | Age: 66
End: 2021-11-01

## 2021-11-01 VITALS — BODY MASS INDEX: 26.12 KG/M2 | WEIGHT: 140.5 LBS

## 2021-11-01 DIAGNOSIS — S43.004D DISLOCATION OF RIGHT SHOULDER JOINT, SUBSEQUENT ENCOUNTER: Primary | ICD-10-CM

## 2021-11-01 DIAGNOSIS — S43.004D DISLOCATION OF RIGHT SHOULDER JOINT, SUBSEQUENT ENCOUNTER: ICD-10-CM

## 2021-11-01 DIAGNOSIS — S46.011D TRAUMATIC INCOMPLETE TEAR OF RIGHT ROTATOR CUFF, SUBSEQUENT ENCOUNTER: Primary | ICD-10-CM

## 2021-11-01 PROCEDURE — 99213 OFFICE O/P EST LOW 20 MIN: CPT | Performed by: STUDENT IN AN ORGANIZED HEALTH CARE EDUCATION/TRAINING PROGRAM

## 2021-11-01 RX ORDER — ACYCLOVIR 200 MG/1
CAPSULE ORAL
COMMUNITY
Start: 2021-10-13

## 2021-11-01 NOTE — LETTER
November 1, 2021      Lorena ALONZO Daniel  [address] [place] [id number]        To Whom It May Concern:    Lorena ALONZO Daniel was seen today in clinic.  Please excuse her until 11/30/21 due to injury.      Sincerely,        Darien Quiñonez MD

## 2021-11-01 NOTE — NURSING NOTE
Chief Complaint   Patient presents with     RECHECK     Right shoulder follow up - feels that PT is going well, notes improvement

## 2021-11-01 NOTE — PATIENT INSTRUCTIONS
Schedule MRI at [facility]    Continue PT    I'll call you after MRI to discuss next steps

## 2021-11-04 ENCOUNTER — TELEPHONE (OUTPATIENT)
Dept: FAMILY MEDICINE | Facility: CLINIC | Age: 66
End: 2021-11-04

## 2021-11-04 NOTE — TELEPHONE ENCOUNTER
Pt called to discuss her MRI Results. Advised her that BFA is not in today and will review once he returns.    Call back # [phone]    Thanks, Gertrude

## 2021-11-05 NOTE — TELEPHONE ENCOUNTER
Reviewed MRI by phone, shoulder surgery consult placed. Darien Quiñonez MD on 11/5/2021 at 4:54 PM

## 2021-11-10 ENCOUNTER — OFFICE VISIT (OUTPATIENT)
Dept: FAMILY MEDICINE | Facility: CLINIC | Age: 66
End: 2021-11-10

## 2021-11-10 VITALS
HEIGHT: 62 IN | TEMPERATURE: 98.2 F | OXYGEN SATURATION: 98 % | WEIGHT: 135 LBS | HEART RATE: 82 BPM | BODY MASS INDEX: 24.84 KG/M2 | SYSTOLIC BLOOD PRESSURE: 126 MMHG | RESPIRATION RATE: 18 BRPM | DIASTOLIC BLOOD PRESSURE: 82 MMHG

## 2021-11-10 DIAGNOSIS — R73.01 ELEVATED FASTING GLUCOSE: ICD-10-CM

## 2021-11-10 DIAGNOSIS — K64.4 EXTERNAL HEMORRHOIDS: ICD-10-CM

## 2021-11-10 DIAGNOSIS — N95.1 MENOPAUSAL SYNDROME (HOT FLASHES): ICD-10-CM

## 2021-11-10 DIAGNOSIS — E03.8 SUBCLINICAL HYPOTHYROIDISM: ICD-10-CM

## 2021-11-10 DIAGNOSIS — Z00.00 ROUTINE GENERAL MEDICAL EXAMINATION AT A HEALTH CARE FACILITY: Primary | ICD-10-CM

## 2021-11-10 DIAGNOSIS — Z86.000 HISTORY OF DUCTAL CARCINOMA IN SITU (DCIS) OF BREAST: ICD-10-CM

## 2021-11-10 DIAGNOSIS — E78.2 MIXED HYPERLIPIDEMIA: ICD-10-CM

## 2021-11-10 PROBLEM — R79.89 ELEVATED LFTS: Status: RESOLVED | Noted: 2020-11-19 | Resolved: 2021-11-10

## 2021-11-10 LAB
CHOLEST SERPL-MCNC: 277 MG/DL (ref 0–199)
CHOLEST/HDLC SERPL: 4 {RATIO} (ref 0–5)
GLUCOSE SERPL-MCNC: 94 MG/DL (ref 60–99)
HBA1C MFR BLD: 5.9 % (ref 4–7)
HDLC SERPL-MCNC: 64 MG/DL (ref 40–150)
LDLC SERPL CALC-MCNC: 161 MG/DL (ref 0–130)
TRIGL SERPL-MCNC: 261 MG/DL (ref 0–149)

## 2021-11-10 PROCEDURE — 99397 PER PM REEVAL EST PAT 65+ YR: CPT | Performed by: PHYSICIAN ASSISTANT

## 2021-11-10 PROCEDURE — 84443 ASSAY THYROID STIM HORMONE: CPT | Mod: 90 | Performed by: PHYSICIAN ASSISTANT

## 2021-11-10 PROCEDURE — 83036 HEMOGLOBIN GLYCOSYLATED A1C: CPT | Performed by: PHYSICIAN ASSISTANT

## 2021-11-10 PROCEDURE — 82947 ASSAY GLUCOSE BLOOD QUANT: CPT | Performed by: PHYSICIAN ASSISTANT

## 2021-11-10 PROCEDURE — 36415 COLL VENOUS BLD VENIPUNCTURE: CPT | Performed by: PHYSICIAN ASSISTANT

## 2021-11-10 PROCEDURE — 80061 LIPID PANEL: CPT | Performed by: PHYSICIAN ASSISTANT

## 2021-11-10 RX ORDER — VENLAFAXINE HYDROCHLORIDE 37.5 MG/1
37.5 CAPSULE, EXTENDED RELEASE ORAL DAILY
Qty: 90 CAPSULE | Refills: 3 | Status: SHIPPED | OUTPATIENT
Start: 2021-11-10 | End: 2022-11-17

## 2021-11-10 ASSESSMENT — MIFFLIN-ST. JEOR: SCORE: 1097.67

## 2021-11-10 NOTE — PROGRESS NOTES
SUBJECTIVE:   CC: Lorena Sanchez is an 66 year old woman who presents for preventive health visit.       Patient has been advised of split billing requirements and indicates understanding: Yes     In clinic today, I had a physician assistant student with me.  The student participated in the office visit with the permission of the patient including the history taking, physical examination, and documentation.       HPI    Hemorrhoids: Noticed in last month. No hx of hemorrhoids but felt she had them off and on through out her life. Has been using a OTC hemorrhoid cream, minor relief. No bleeding. States feels is more on constipation consistency. Does strain occasionally. Has been increasing fluids and fiber. Used Miralax 2-3 month. Sxs are feeling of a bulge in anus. Denies pain, no blood in or on stool.    Acyclovir: Never used it. Was Rx by dermatologist for cold sores. Currently does not have active sores, last time was 2 years. Gets cold sores twice a year. Also Rx Valtrex, but cancelled due to cost.       Hot flashes are well controlled with Venlafaxine.     The 10-year ASCVD risk score (Denisse DC Jr., et al., 2013) is: 6.6%    Values used to calculate the score:      Age: 66 years      Sex: Female      Is Non- : No      Diabetic: No      Tobacco smoker: No      Systolic Blood Pressure: 126 mmHg      Is BP treated: No      HDL Cholesterol: 64 mg/dL      Total Cholesterol: 277 mg/dL      Today's PHQ-2 Score:   PHQ-2 ( 1999 Pfizer) 10/4/2021   Q1: Little interest or pleasure in doing things 0   Q2: Feeling down, depressed or hopeless 0   PHQ-2 Score 0       Abuse: Current or Past (Physical, Sexual or Emotional) - No  Do you feel safe in your environment? Yes        Social History     Tobacco Use     Smoking status: Never Smoker     Smokeless tobacco: Never Used   Substance Use Topics     Alcohol use: No         Reviewed orders with patient.  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     Allergic rhinitis, seasonal     ACP (advance care planning)     Health Care Home     Subclinical hypothyroidism     Elevated fasting glucose     Mixed hyperlipidemia     Past Surgical History:   Procedure Laterality Date     BIOPSY BREAST NEEDLE LOCALIZATION  03/16/2012    Procedure:BIOPSY BREAST NEEDLE LOCALIZATION; Left Breast Wire Localized Excisional biopsy; Surgeon:BEN RAWLS; Location:[facility]     BREAST BIOPSY, RT/LT      left     COLONOSCOPY N/A 08/28/2017    Procedure: COMBINED COLONOSCOPY, SINGLE OR MULTIPLE BIOPSY/POLYPECTOMY BY BIOPSY;  colonoscopy with polypectomy by cold jumbcarlos forcep;  Surgeon: Tiera Vazquez MD;  Location: [facility]     LUMPECTOMY BREAST Left 2012       Social History     Tobacco Use     Smoking status: Never Smoker     Smokeless tobacco: Never Used   Substance Use Topics     Alcohol use: No     Family History   Problem Relation Age of Onset     Diabetes Type 2  Mother      Heart Failure Father      Hyperlipidemia Father      Cardiac Sudden Death Father      Parkinsonism Maternal Grandmother      Hyperlipidemia Brother      Prostate Cancer Brother 60     No Known Problems Brother      No Known Problems Brother      Asthma Daughter      Colon Cancer No family hx of          Current Outpatient Medications   Medication Sig Dispense Refill     acyclovir (ZOVIRAX) 200 MG capsule        calcium carbonate (OS-JESUS 500 MG [place]) 500 MG tablet Take 500 mg by mouth 2 times daily       cetirizine (ZYRTEC) 10 MG tablet Take 10 mg by mouth       cholecalciferol 25 MCG (1000  UT) TABS Take 1,000 Units by mouth       mometasone (NASONEX) 50 MCG/ACT nasal spray 2 sprays       multivitamin, therapeutic with minerals (MULTI-VITAMIN) TABS Take 1 tablet by mouth daily       venlafaxine (EFFEXOR-XR) 37.5 MG 24 hr capsule Take 1 capsule (37.5 mg) by mouth daily 90 capsule 3     vitamin B-12 (CYANOCOBALAMIN) 1000 MCG tablet Take 1,000 mcg by mouth       Allergies   Allergen Reactions     Penicillins Nausea and Vomiting     Sulfa Drugs Hives     Recent Labs   Lab Test 11/10/21  0926 11/10/21  0904 11/10/21  0000 07/02/21  1016 07/02/21  0816 07/02/21  0000 10/07/20  0000   A1C  --  5.9  --   --  6.0  --  6.0*   LDL  --   --  161*  --   --  152* 116*   HDL  --   --  64  --   --  55 52   TRIG  --   --  261*  --   --  277* 375*   CR  --   --   --   --   --  0.82  --    POTASSIUM  --   --   --   --   --  4.57  --    TSH 3.55  --   --  4.61*  --   --  4.850*        Breast Cancer Screening:  Any new diagnosis of family breast, ovarian, or bowel cancer? No    FHS-7: No flowsheet data found.  click delete button to remove this line now  Mammogram Screening: Recommended mammography every 1-2 years with patient discussion and risk factor consideration. Scheduled in 2 weeks for her yearly.   Pertinent mammograms are reviewed under the imaging tab.    History of abnormal Pap smear: NO - age 65 - see link Cervical Cytology Screening Guidelines     Reviewed and updated as needed this visit by clinical staff  Tobacco  Allergies  Meds           Dental in August, will schedule vision exam.     Reviewed and updated as needed this visit by Provider               Past Medical History:   Diagnosis Date     DCIS (ductal carcinoma in situ) 03/01/2012    lt breast      Elevated LFTs 11/19/2020     Pneumonia due to infectious organism     early 2000s     PONV (postoperative nausea and vomiting)       Past Surgical History:   Procedure Laterality Date     BIOPSY BREAST NEEDLE LOCALIZATION  03/16/2012    Procedure:BIOPSY  "BREAST NEEDLE LOCALIZATION; Left Breast Wire Localized Excisional biopsy; Surgeon:BEN RAWLS; Location:[facility]     BREAST BIOPSY, RT/LT      left     COLONOSCOPY N/A 08/28/2017    Procedure: COMBINED COLONOSCOPY, SINGLE OR MULTIPLE BIOPSY/POLYPECTOMY BY BIOPSY;  colonoscopy with polypectomy by cold jumbo forcep;  Surgeon: Tiera Vazquez MD;  Location: [facility]     LUMPECTOMY BREAST Left 2012       Review of Systems  CONSTITUTIONAL: NEGATIVE for fever, chills, change in weight  INTEGUMENTARY/SKIN: NEGATIVE for worrisome rashes, moles or lesions  EYES: NEGATIVE for vision changes or irritation  ENT: NEGATIVE for ear, mouth and throat problems  RESP: NEGATIVE for significant cough or SOB  BREAST: NEGATIVE for masses, tenderness or discharge  CV: NEGATIVE for chest pain, palpitations or peripheral edema  GI: NEGATIVE for nausea, abdominal pain, heartburn, or change in bowel habits  : NEGATIVE for unusual urinary or vaginal symptoms. No vaginal bleeding.  MUSCULOSKELETAL: NEGATIVE for significant arthralgias or myalgia  NEURO: NEGATIVE for weakness, dizziness or paresthesias  PSYCHIATRIC: NEGATIVE for changes in mood or affect      OBJECTIVE:   /82 (BP Location: Right arm, Patient Position: Sitting, Cuff Size: Adult Regular)   Pulse 82   Temp 98.2  F (36.8  C) (Oral)   Resp 18   Ht 1.562 m (5' 1.5\")   Wt 61.2 kg (135 lb)   LMP 12/10/2011   SpO2 98%   BMI 25.09 kg/m    Physical Exam  GENERAL: healthy, alert and no distress  EYES: Eyes grossly normal to inspection, PERRL and conjunctivae and sclerae normal  HENT: ear canals and TM's normal, nose and mouth without ulcers or lesions  NECK: no adenopathy, no asymmetry, masses, or scars and thyroid normal to palpation  RESP: lungs clear to auscultation - no rales, rhonchi or wheezes  BREAST: normal without masses, tenderness or nipple discharge and no palpable axillary masses or adenopathy  CV: regular rate and rhythm, normal S1 S2, no S3 or S4, no " "murmur, click or rub, no peripheral edema and peripheral pulses strong  ABDOMEN: soft, nontender, no hepatosplenomegaly, no masses and bowel sounds normal  MS: no gross musculoskeletal defects noted, no edema  SKIN: no suspicious lesions or rashes  Rectal: two skin-colored hemorrhoids visible at 3 o'clock and 6 o 'clock approx 2 mm each.  No evidence of infection or thrombosis. Non-tender.  NEURO: Normal strength and tone, mentation intact and speech normal  PSYCH: mentation appears normal, affect normal/bright    Diagnostic Test Results:  Labs reviewed in Epic    ASSESSMENT/PLAN:       ICD-10-CM    1. Routine general medical examination at a health care facility  Z00.00 VENOUS COLLECTION     Lipid Panel ([facility]FP)   2. Elevated fasting glucose  R73.01 HEMOGLOBIN A1C (BFP)     VENOUS COLLECTION     Glucose Fasting (BFP)   3. 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 Mixed hyperlipidemia  E78.2 VENOUS COLLECTION     Lipid Panel (BFP)   5. Subclinical hypothyroidism  E03.8 VENOUS COLLECTION     TSH WITH FREE T4 REFLEX ([facility])   6. Menopausal syndrome (hot flashes)  N95.1 venlafaxine (EFFEXOR-XR) 37.5 MG 24 hr capsule   7. External hemorrhoids  K64.4 Colorectal Surgery Referral     -External hemorrhoids: Recommended increase fluid intake and fiber in her diet to reduce constipation. Stated okay to take serving of Miralax daily to achieve softer stools. Referral to GI for further eval    -Menopausal sxs: Continue Effexor.     Patient has been advised of split billing requirements and indicates understanding: Yes     COUNSELING:  Reviewed preventive health counseling, as reflected in patient instructions    Estimated body mass index is 25.09 kg/m  as calculated from the following:    Height as of this encounter: 1.562 m (5' 1.5\").    Weight as of this encounter: 61.2 kg (135 lb).    Weight management plan: Discussed healthy diet and exercise guidelines    She reports that she has " never smoked. She has never used smokeless tobacco.      Counseling Resources:  ATP IV Guidelines  Pooled Cohorts Equation Calculator  Breast Cancer Risk Calculator  BRCA-Related Cancer Risk Assessment: FHS-7 Tool  FRAX Risk Assessment  ICSI Preventive Guidelines  Dietary Guidelines for Americans, 2010  USDA's MyPlate  ASA Prophylaxis  Lung CA Screening    SKYLAR Mckeon  [facility]

## 2021-11-10 NOTE — PATIENT INSTRUCTIONS
Preventive Health Recommendations    See your health care provider every year to    Review health changes.     Discuss preventive care.      Review your medicines if your doctor has prescribed any.      You no longer need a yearly Pap test unless you've had an abnormal Pap test in the past 10 years. If you have vaginal symptoms, such as bleeding or discharge, be sure to talk with your provider about a Pap test.      Every 1 to 2 years, have a mammogram.  If you are over 69, talk with your health care provider about whether or not you want to continue having screening mammograms.      Every 10 years, have a colonoscopy. Or, have a yearly FIT test (stool test). These exams will check for colon cancer.       Have a cholesterol test every 5 years, or more often if your doctor advises it.       Have a diabetes test (fasting glucose) every three years. If you are at risk for diabetes, you should have this test more often.       At age 65, have a bone density scan (DEXA) to check for osteoporosis (brittle bone disease).    Shots:    Get a flu shot each year.    Get a tetanus shot every 10 years.    Talk to your doctor about your pneumonia vaccines. There are now two you should receive - Pneumovax (PPSV 23) and Prevnar (PCV 13).    Talk to your pharmacist about the shingles vaccine.    Talk to your doctor about the hepatitis B vaccine.    Nutrition:     Eat at least 5 servings of fruits and vegetables each day.      Eat whole-grain bread, whole-wheat pasta and brown rice instead of white grains and rice.      Get adequate about Calcium and Vitamin D.     Lifestyle    Exercise at least 150 minutes a week (30 minutes a day, 5 days a week). This will help you control your weight and prevent disease.      Limit alcohol to one drink per day.      No smoking.       Wear sunscreen to prevent skin cancer.       See your dentist twice a year for an exam and cleaning.      See your eye doctor every 1 to 2 years to screen for  conditions such as glaucoma, macular degeneration, cataracts, etc.    Personalized Prevention Plan  You are due for the preventive services outlined below.  Your care team is available to assist you in scheduling these services.  If you have already completed any of these items, please share that information with your care team to update in your medical record.    Health Maintenance Due   Topic Date Due     COVID-19 Vaccine (3 - Booster for Moderna series) 08/27/2021

## 2021-11-10 NOTE — NURSING NOTE
Chief Complaint   Patient presents with     Physical     fasting, no concerns     Pre-visit Screening:  Immunizations:  up to date  Colonoscopy:  is up to date  Mammogram: is up to date  Asthma Action Test/Plan:  NA  PHQ9:  No concerns  GAD7:  No concerns  Questioned patient about current smoking habits Pt. has never smoked.  Ok to leave detailed message on voice mail for today's visit only YES, phone #[phone]

## 2021-11-11 LAB — TSH SERPL-ACNC: 3.55 MIU/L (ref 0.4–4.5)

## 2021-11-13 NOTE — PROGRESS NOTES
"ASSESSMENT & PLAN    1. Dislocation of right shoulder joint, subsequent encounter  Still making progress in PT but slowing, given painful \"catch with progression of abduction recommend MRI to eval further, manpreet supraspinatus. OK to continue PT, and will follow-up by phone after MRI. New letter for work written today.  - MR Shoulder Right w/o Contrast; Future  - Radiology Referral; Future     Patient Instructions   Schedule MRI at [facility]    Continue PT    I'll call you after MRI to discuss next steps    Darien Quiñonez MD, [facility]  Primary Care Sports Medicine   -----    SUBJECTIVE:  Lorena Sanchez is a 66 year old female who is seen in follow-up for shoulder injury.They were last seen 10/18/2021.   Continued improvement.  Working with PT, compliant with HEP  Sometimes feels a \"catch\" with shoulder elevation above 90 deg  No distal radicular sx.    The patient is seen by themselves.      OBJECTIVE:  Wt 63.7 kg (140 lb 8 oz)   LMP 12/10/2011   BMI 26.12 kg/m     General: healthy, alert and in no distress  HEENT: no scleral icterus or conjunctival erythema  Skin: no suspicious lesions or rash.   Resp: normal respiratory effort without conversational dyspnea   Psych: normal mood and affect  Gait: normal steady gait     MSK:  AROM 130 abd with pain/100 flex/70 ER  Deltoid activation intact.   Strength intact in abd/er/ir  Mild pain with empty can testing but strength intact  SILT throughout RUE   Distal CMS intact       Imaging:  No results found.     "

## 2021-11-15 ENCOUNTER — OFFICE VISIT (OUTPATIENT)
Dept: FAMILY MEDICINE | Facility: CLINIC | Age: 66
End: 2021-11-15

## 2021-11-15 ENCOUNTER — MYC MEDICAL ADVICE (OUTPATIENT)
Dept: FAMILY MEDICINE | Facility: CLINIC | Age: 66
End: 2021-11-15

## 2021-11-15 VITALS
BODY MASS INDEX: 25.43 KG/M2 | OXYGEN SATURATION: 97 % | TEMPERATURE: 98.2 F | HEIGHT: 62 IN | WEIGHT: 138.2 LBS | DIASTOLIC BLOOD PRESSURE: 82 MMHG | SYSTOLIC BLOOD PRESSURE: 130 MMHG | HEART RATE: 91 BPM

## 2021-11-15 DIAGNOSIS — N95.1 MENOPAUSAL SYNDROME (HOT FLASHES): ICD-10-CM

## 2021-11-15 DIAGNOSIS — E03.8 SUBCLINICAL HYPOTHYROIDISM: ICD-10-CM

## 2021-11-15 DIAGNOSIS — M25.511 CHRONIC RIGHT SHOULDER PAIN: ICD-10-CM

## 2021-11-15 DIAGNOSIS — Z01.818 PREOPERATIVE EXAMINATION: Primary | ICD-10-CM

## 2021-11-15 DIAGNOSIS — G89.29 CHRONIC RIGHT SHOULDER PAIN: ICD-10-CM

## 2021-11-15 DIAGNOSIS — Z86.000 HISTORY OF DUCTAL CARCINOMA IN SITU (DCIS) OF BREAST: ICD-10-CM

## 2021-11-15 DIAGNOSIS — E78.2 MIXED HYPERLIPIDEMIA: ICD-10-CM

## 2021-11-15 LAB
% GRANULOCYTES: 61.6 %
BUN SERPL-MCNC: 15 MG/DL (ref 7–25)
BUN/CREATININE RATIO: 18.8 (ref 6–22)
CALCIUM SERPL-MCNC: 10.1 MG/DL (ref 8.6–10.3)
CHLORIDE SERPLBLD-SCNC: 103.4 MMOL/L (ref 98–110)
CO2 SERPL-SCNC: 27.1 MMOL/L (ref 20–32)
CREAT SERPL-MCNC: 0.8 MG/DL (ref 0.6–1.3)
GLUCOSE SERPL-MCNC: 124 MG/DL (ref 60–99)
HCT VFR BLD AUTO: 42.3 % (ref 35–47)
HEMOGLOBIN: 14.4 G/DL (ref 11.7–15.7)
LYMPHOCYTES NFR BLD AUTO: 29.5 %
MCH RBC QN AUTO: 29.9 PG (ref 26–33)
MCHC RBC AUTO-ENTMCNC: 34 G/DL (ref 31–36)
MCV RBC AUTO: 87.7 FL (ref 78–100)
MONOCYTES NFR BLD AUTO: 8.9 %
PLATELET COUNT - QUEST: 296 10^9/L (ref 150–375)
POTASSIUM SERPL-SCNC: 4.65 MMOL/L (ref 3.5–5.3)
RBC # BLD AUTO: 4.82 10*12/L (ref 3.8–5.2)
SODIUM SERPL-SCNC: 139.8 MMOL/L (ref 135–146)
WBC # BLD AUTO: 6.7 10*9/L (ref 4–11)

## 2021-11-15 PROCEDURE — 99215 OFFICE O/P EST HI 40 MIN: CPT | Performed by: FAMILY MEDICINE

## 2021-11-15 PROCEDURE — 36415 COLL VENOUS BLD VENIPUNCTURE: CPT | Performed by: FAMILY MEDICINE

## 2021-11-15 PROCEDURE — 85025 COMPLETE CBC W/AUTO DIFF WBC: CPT | Performed by: FAMILY MEDICINE

## 2021-11-15 PROCEDURE — 93000 ELECTROCARDIOGRAM COMPLETE: CPT | Performed by: FAMILY MEDICINE

## 2021-11-15 PROCEDURE — 80048 BASIC METABOLIC PNL TOTAL CA: CPT | Performed by: FAMILY MEDICINE

## 2021-11-15 ASSESSMENT — MIFFLIN-ST. JEOR: SCORE: 1112.18

## 2021-11-15 NOTE — PROGRESS NOTES
[facility] PHYSICIANS  [address]  [address]  [facility] [address]  Phone: [phone]  Fax: [phone]  Primary Provider: Kerry Marti  Pre-op Performing Provider: HECTOR SLOAN      PREOPERATIVE EVALUATION:  Today's date: 11/15/2021    Lorena Sanchez is a 66 year old female who presents for a preoperative evaluation.  (55)    Surgical Information:  Surgery/Procedure: Right shoulder repair   Surgery Location: [facility] [place]  Surgeon: Dr. Mchugh   Surgery Date: 21  Time of Surgery: TBD  Where patient plans to recover: At home with family  Fax number for surgical facility: [phone]    Type of Anesthesia Anticipated: to be determined    Assessment & Plan     The proposed surgical procedure is considered INTERMEDIATE risk.    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 Preoperative examination    - HEMOGRAM PLATELET DIFF (BFP)  - VENOUS COLLECTION  - Basic Metabolic Panel (BFP)  - EKG 12-lead complete w/read - Clinics    2. Chronic right shoulder pain      3. 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 Mixed hyperlipidemia      5. Subclinical hypothyroidism      6. Menopausal syndrome (hot flashes)        Risks and Recommendations:  The patient has the following additional risks and recommendations for perioperative complications:   - No identified additional risk factors other than previously  addressed    Medication Instructions:  Patient is to take all scheduled medications on the day of surgery    RECOMMENDATION:  APPROVAL GIVEN to proceed with proposed procedure, without further diagnostic evaluation.      Subjective     HPI related to upcoming procedure: here for preop for right shoulder surgery. Fell and dislocated her shoulder October 3rd. Has been doing PT and had an MRI and decided on surgery. A couple of small tears.  Has decreased rom. Has some upper arm pain at times.    1. No - Have you ever had a heart attack or stroke?  2. No - Have you ever had surgery on your heart or blood vessels, such as a stent, coronary (heart) bypass, or surgery on an artery in the head, neck, heart, or legs?  3. No - Do you have chest pain when you are physically active?  4. No - Do you have a history of heart failure?  5. No - Do you currently have a cold, bronchitis, or symptoms of other respiratory (head and chest) infections?  6. No - Do you have a cough, shortness of breath, or wheezing?  7. No - Do you or anyone in your family have a history of blood clots?  8. No - Do you or anyone in your family have a serious bleeding problem, such as long-lasting bleeding after surgeries or cuts?  9. No - Have you ever had anemia or been told to take iron pills?  10. No - Have you had any abnormal blood loss such as black, tarry or bloody stools, or abnormal vaginal bleeding?  11. No - Have you ever had a blood transfusion?  12. Yes - Are you willing to have a blood transfusion if it is medically needed before, during, or after your surgery?  13. Yes - Have you or anyone in your family ever had problems with anesthesia (sedation for surgery)? Nausea and vomiting  14. No - Do you have sleep apnea, excessive snoring, or daytime drowsiness?   15. No - Do you have any artifical heart valves or other implanted medical devices, such as a pacemaker, defibrillator, or continuous glucose monitor?  16. No - Do you have any  artifical joints?  17. No - Are you allergic to latex?  18. No - Is there any chance that you may be pregnant?    Health Care Directive:  Patient does not have a Health Care Directive or Living Will: Discussed advance care planning with patient; information given to patient to review.    Preoperative Review of :   reviewed - no record of controlled substances prescribed.          Review of Systems  CONSTITUTIONAL: NEGATIVE for fever, chills, change in weight  INTEGUMENTARY/SKIN: NEGATIVE for worrisome rashes, moles or lesions  EYES: NEGATIVE for vision changes or irritation  ENT/MOUTH: NEGATIVE for ear, mouth and throat problems  RESP: NEGATIVE for significant cough or SOB  CV: NEGATIVE for chest pain, palpitations or peripheral edema  GI: NEGATIVE for nausea, abdominal pain, heartburn, or change in bowel habits  : NEGATIVE for frequency, dysuria, or hematuria  MUSCULOSKELETAL: NEGATIVE for significant arthralgias or myalgia  NEURO: NEGATIVE for weakness, dizziness or paresthesias  ENDOCRINE: NEGATIVE for temperature intolerance, skin/hair changes  HEME: NEGATIVE for bleeding problems  PSYCHIATRIC: NEGATIVE for changes in mood or affect    Patient Active Problem List    Diagnosis Date Noted     Menopausal syndrome (hot flashes) 11/15/2021     Priority: Medium     Elevated fasting glucose 07/01/2021     Priority: Medium     Mixed hyperlipidemia 07/01/2021     Priority: Medium     Allergic rhinitis, seasonal 11/19/2020     Priority: Medium     Overview:   grass and tree pollen       ACP (advance care planning) 11/19/2020     Priority: Medium     Health Care Home 11/19/2020     Priority: Medium     Subclinical hypothyroidism 11/19/2020     Priority: Medium     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      Priority: Medium      Past Medical History:   Diagnosis Date     DCIS (ductal carcinoma in situ) 03/01/2012    lt breast      Elevated LFTs 11/19/2020     Pneumonia due to  infectious organism     early 2000s     PONV (postoperative nausea and vomiting)      Past Surgical History:   Procedure Laterality Date     BIOPSY BREAST NEEDLE LOCALIZATION  03/16/2012    Procedure:BIOPSY BREAST NEEDLE LOCALIZATION; Left Breast Wire Localized Excisional biopsy; Surgeon:BEN RAWLS; Location:[facility] OR     BREAST BIOPSY, RT/LT      left     COLONOSCOPY N/A 08/28/2017    Procedure: COMBINED COLONOSCOPY, SINGLE OR MULTIPLE BIOPSY/POLYPECTOMY BY BIOPSY;  colonoscopy with polypectomy by cold jugina forcep;  Surgeon: Tiera Vazquez MD;  Location: [facility]     LUMPECTOMY BREAST Left 2012     Current Outpatient Medications   Medication Sig Dispense Refill     acyclovir (ZOVIRAX) 200 MG capsule        calcium carbonate (OS-JESUS 500 MG [place]. [place]) 500 MG tablet Take 500 mg by mouth 2 times daily       cetirizine (ZYRTEC) 10 MG tablet Take 10 mg by mouth       cholecalciferol 25 MCG (1000 UT) TABS Take 1,000 Units by mouth       mometasone (NASONEX) 50 MCG/ACT nasal spray 2 sprays       multivitamin, therapeutic with minerals (MULTI-VITAMIN) TABS Take 1 tablet by mouth daily       venlafaxine (EFFEXOR-XR) 37.5 MG 24 hr capsule Take 1 capsule (37.5 mg) by mouth daily 90 capsule 3     vitamin B-12 (CYANOCOBALAMIN) 1000 MCG tablet Take 1,000 mcg by mouth         Allergies   Allergen Reactions     Penicillins Nausea and Vomiting     Sulfa Drugs Hives        Social History     Tobacco Use     Smoking status: Never Smoker     Smokeless tobacco: Never Used   Substance Use Topics     Alcohol use: No     Family History   Problem Relation Age of Onset     Diabetes Type 2  Mother      Heart Failure Father      Hyperlipidemia Father      Cardiac Sudden Death Father      Parkinsonism Maternal Grandmother      Hyperlipidemia Brother      Prostate Cancer Brother 60     No Known Problems Brother      No Known Problems Brother      Asthma Daughter      Colon Cancer No family hx of      History   Drug Use No         Objective  "    /82 (BP Location: Left arm, Patient Position: Sitting, Cuff Size: Adult Regular)   Pulse 91   Temp 98.2  F (36.8  C) (Temporal)   Ht 1.562 m (5' 1.5\")   Wt 62.7 kg (138 lb 3.2 oz)   LMP 12/10/2011   SpO2 97%   BMI 25.69 kg/m      Physical Exam    GENERAL APPEARANCE: healthy, alert and no distress     EYES: EOMI, PERRL     HENT: ear canals and TM's normal and nose and mouth without ulcers or lesions     NECK: no adenopathy, no asymmetry, masses, or scars and thyroid normal to palpation     RESP: lungs clear to auscultation - no rales, rhonchi or wheezes     CV: regular rates and rhythm, normal S1 S2, no S3 or S4 and no murmur, click or rub     ABDOMEN:  soft, nontender, no HSM or masses and bowel sounds normal     MS: extremities normal- no gross deformities noted, no evidence of inflammation in joints, FROM in all extremities.     SKIN: no suspicious lesions or rashes     NEURO: Normal strength and tone, sensory exam grossly normal, mentation intact and speech normal     PSYCH: mentation appears normal. and affect normal/bright     LYMPHATICS: No cervical adenopathy    Recent Labs   Lab Test 11/10/21  0904 07/02/21  0816 07/02/21  0000 11/19/20  1601   HGB  --   --   --  14.3   PLT  --   --   --  264   NA  --   --  138.5  --    POTASSIUM  --   --  4.57  --    CR  --   --  0.82  --    A1C 5.9 6.0  --   --         Diagnostics:  Recent Results (from the past 168 hour(s))   HEMOGRAM PLATELET DIFF (BFP)    Collection Time: 11/15/21  2:48 PM   Result Value Ref Range    WBC 6.7 4.0 - 11 10*9/L    RBC Count 4.82 3.8 - 5.2 10*12/L    Hemoglobin 14.4 11.7 - 15.7 g/dL    Hematocrit 42.3 35.0 - 47.0 %    MCV 87.7 78 - 100 fL    MCH 29.9 26 - 33 pg    MCHC 34.0 31 - 36 g/dL    Platelet Count 296 150 - 375 10^9/L    % Granulocytes 61.6 %    % Lymphocytes 29.5 %    % Monocytes 8.9 %   Basic Metabolic Panel (BFP)    Collection Time: 11/15/21  3:23 PM   Result Value Ref Range    Carbon Dioxide 27.1 20 - 32 mmol/L    " Creatinine 0.80 0.60 - 1.30 mg/dL    Glucose 124 (A) 60 - 99 mg/dL    Sodium 139.8 135 - 146 mmol/L    Potassium 4.65 3.5 - 5.3 mmol/L    Chloride 103.4 98 - 110 mmol/L    Urea Nitrogen 15 7 - 25 mg/dL    Calcium 10.1 8.6 - 10.3 mg/dL    BUN/Creatinine Ratio 18.8 6 - 22      EKG: appears normal, NSR, normal axis, normal intervals, no acute ST/T changes c/w ischemia, no LVH by voltage criteria  Personally reviewed by me, discussed also with cardiologist, no concerns for surgery    Revised Cardiac Risk Index (RCRI):  The patient has the following serious cardiovascular risks for perioperative complications:   - No serious cardiac risks = 0 points     RCRI Interpretation: 0 points: Class I (very low risk - 0.4% complication rate)           Signed Electronically by: Divya Rand MD  Copy of this evaluation report is provided to requesting physician.

## 2022-01-11 ENCOUNTER — TRANSFERRED RECORDS (OUTPATIENT)
Dept: FAMILY MEDICINE | Facility: CLINIC | Age: 67
End: 2022-01-11

## 2022-01-14 ENCOUNTER — HOSPITAL ENCOUNTER (OUTPATIENT)
Dept: MAMMOGRAPHY | Facility: CLINIC | Age: 67
Discharge: HOME | End: 2022-01-14
Attending: OBSTETRICS & GYNECOLOGY | Admitting: OBSTETRICS & GYNECOLOGY
Payer: MEDICARE

## 2022-01-14 DIAGNOSIS — Z12.31 VISIT FOR SCREENING MAMMOGRAM: ICD-10-CM

## 2022-01-14 PROCEDURE — 77067 SCR MAMMO BI INCL CAD: CPT

## 2022-02-15 ENCOUNTER — TRANSFERRED RECORDS (OUTPATIENT)
Dept: FAMILY MEDICINE | Facility: CLINIC | Age: 67
End: 2022-02-15

## 2022-10-22 ENCOUNTER — HEALTH MAINTENANCE LETTER (OUTPATIENT)
Age: 67
End: 2022-10-22

## 2022-10-24 DIAGNOSIS — N95.1 MENOPAUSAL SYNDROME (HOT FLASHES): ICD-10-CM

## 2022-10-24 RX ORDER — VENLAFAXINE HYDROCHLORIDE 37.5 MG/1
CAPSULE, EXTENDED RELEASE ORAL
Qty: 90 CAPSULE | Refills: 3 | COMMUNITY
Start: 2022-10-24

## 2022-10-24 NOTE — TELEPHONE ENCOUNTER
Refused Prescriptions:                       Disp   Refills    venlafaxine (EFFEXOR XR) 37.5 MG 24 hr cap*90 cap*3        Sig: TAKE 1 CAPSULE BY MOUTH EVERY DAY  Refused By: DHARA CRAIG  Reason for Refusal: Patient needs appointment  Reason for Refusal Comment: last ov/ appt     Last refill was 11-  Last ov was 11-  No future appt   Pt due for a yearly med check  Sent my chart  Dhara  [phone] (home) [phone] (work)

## 2022-11-17 ENCOUNTER — OFFICE VISIT (OUTPATIENT)
Dept: FAMILY MEDICINE | Facility: CLINIC | Age: 67
End: 2022-11-17

## 2022-11-17 VITALS
OXYGEN SATURATION: 98 % | TEMPERATURE: 97.8 F | SYSTOLIC BLOOD PRESSURE: 126 MMHG | DIASTOLIC BLOOD PRESSURE: 82 MMHG | BODY MASS INDEX: 25.83 KG/M2 | HEART RATE: 73 BPM | HEIGHT: 62 IN | WEIGHT: 140.4 LBS

## 2022-11-17 DIAGNOSIS — E78.2 MIXED HYPERLIPIDEMIA: ICD-10-CM

## 2022-11-17 DIAGNOSIS — R73.01 ELEVATED FASTING GLUCOSE: ICD-10-CM

## 2022-11-17 DIAGNOSIS — Z01.419 ENCOUNTER FOR GYNECOLOGICAL EXAMINATION WITHOUT ABNORMAL FINDING: Primary | ICD-10-CM

## 2022-11-17 DIAGNOSIS — N95.1 MENOPAUSAL SYNDROME (HOT FLASHES): ICD-10-CM

## 2022-11-17 LAB
ALBUMIN SERPL-MCNC: 4.5 G/DL (ref 3.6–5.1)
ALBUMIN/GLOB SERPL: 1.9 {RATIO} (ref 1–2.5)
ALP SERPL-CCNC: 81 U/L (ref 33–130)
ALT 1742-6: 61 U/L (ref 0–32)
AST 1920-8: 41 U/L (ref 0–35)
BILIRUB SERPL-MCNC: 0.7 MG/DL (ref 0.2–1.2)
BUN SERPL-MCNC: 14 MG/DL (ref 7–25)
BUN/CREATININE RATIO: 17.9 (ref 6–22)
CALCIUM SERPL-MCNC: 9.9 MG/DL (ref 8.6–10.3)
CHLORIDE SERPLBLD-SCNC: 102.8 MMOL/L (ref 98–110)
CHOLEST SERPL-MCNC: 232 MG/DL (ref 0–199)
CHOLEST/HDLC SERPL: 4 {RATIO} (ref 0–5)
CO2 SERPL-SCNC: 31.4 MMOL/L (ref 20–32)
CREAT SERPL-MCNC: 0.78 MG/DL (ref 0.6–1.3)
GLOBULIN, CALCULATED - QUEST: 2.4 (ref 1.9–3.7)
GLUCOSE SERPL-MCNC: 100 MG/DL (ref 60–99)
HDLC SERPL-MCNC: 53 MG/DL (ref 40–150)
LDLC SERPL CALC-MCNC: 121 MG/DL (ref 0–130)
POTASSIUM SERPL-SCNC: 4.87 MMOL/L (ref 3.5–5.3)
PROT SERPL-MCNC: 6.9 G/DL (ref 6.1–8.1)
SODIUM SERPL-SCNC: 140 MMOL/L (ref 135–146)
TRIGL SERPL-MCNC: 291 MG/DL (ref 0–149)

## 2022-11-17 PROCEDURE — 36415 COLL VENOUS BLD VENIPUNCTURE: CPT | Performed by: PHYSICIAN ASSISTANT

## 2022-11-17 PROCEDURE — 90677 PCV20 VACCINE IM: CPT | Performed by: PHYSICIAN ASSISTANT

## 2022-11-17 PROCEDURE — 80061 LIPID PANEL: CPT | Performed by: PHYSICIAN ASSISTANT

## 2022-11-17 PROCEDURE — 80053 COMPREHEN METABOLIC PANEL: CPT | Performed by: PHYSICIAN ASSISTANT

## 2022-11-17 PROCEDURE — 99397 PER PM REEVAL EST PAT 65+ YR: CPT | Mod: 25 | Performed by: PHYSICIAN ASSISTANT

## 2022-11-17 PROCEDURE — G0009 ADMIN PNEUMOCOCCAL VACCINE: HCPCS | Performed by: PHYSICIAN ASSISTANT

## 2022-11-17 RX ORDER — VENLAFAXINE HYDROCHLORIDE 37.5 MG/1
37.5 CAPSULE, EXTENDED RELEASE ORAL DAILY
Qty: 90 CAPSULE | Refills: 3 | Status: SHIPPED | OUTPATIENT
Start: 2022-11-17 | End: 2023-11-10

## 2022-11-17 NOTE — NURSING NOTE
Chief Complaint   Patient presents with     Physical     Fasting today      Recheck Medication     Refill medications     Pre-visit Screening:  Immunizations:  not up to date - prevnar 20 today  Colonoscopy:  is up to date  Mammogram: is up to date  Asthma Action Test/Plan:  NA  PHQ9:  Done today  GAD7:  Done today  Questioned patient about current smoking habits Pt. has never smoked.  Ok to leave detailed message on voice mail for today's visit only Yes, phone # [phone]

## 2022-11-17 NOTE — PATIENT INSTRUCTIONS
Preventive Health Recommendations    See your health care provider every year to    Review health changes.     Discuss preventive care.      Review your medicines if your doctor has prescribed any.      You no longer need a yearly Pap test unless you've had an abnormal Pap test in the past 10 years. If you have vaginal symptoms, such as bleeding or discharge, be sure to talk with your provider about a Pap test.      Every 1 to 2 years, have a mammogram.  If you are over 69, talk with your health care provider about whether or not you want to continue having screening mammograms.      Every 10 years, have a colonoscopy. Or, have a yearly FIT test (stool test). These exams will check for colon cancer.       Have a cholesterol test every 5 years, or more often if your doctor advises it.       Have a diabetes test (fasting glucose) every three years. If you are at risk for diabetes, you should have this test more often.       At age 65, have a bone density scan (DEXA) to check for osteoporosis (brittle bone disease).    Shots:    Get a flu shot each year.    Get a tetanus shot every 10 years.    Talk to your doctor about your pneumonia vaccines. There are now two you should receive - Pneumovax (PPSV 23) and Prevnar (PCV 13).    Talk to your pharmacist about the shingles vaccine.    Talk to your doctor about the hepatitis B vaccine.    Nutrition:     Eat at least 5 servings of fruits and vegetables each day.      Eat whole-grain bread, whole-wheat pasta and brown rice instead of white grains and rice.      Get adequate about Calcium and Vitamin D.     Lifestyle    Exercise at least 150 minutes a week (30 minutes a day, 5 days a week). This will help you control your weight and prevent disease.      Limit alcohol to one drink per day.      No smoking.       Wear sunscreen to prevent skin cancer.       See your dentist twice a year for an exam and cleaning.      See your eye doctor every 1 to 2 years to screen for  conditions such as glaucoma, macular degeneration, cataracts, etc.    Personalized Prevention Plan  You are due for the preventive services outlined below.  Your care team is available to assist you in scheduling these services.  If you have already completed any of these items, please share that information with your care team to update in your medical record.    Health Maintenance Due   Topic Date Due     Pneumococcal Vaccine (2 - PCV) 11/19/2021     COVID-19 Vaccine (4 - Booster for Moderna series) 02/05/2022

## 2022-11-17 NOTE — PROGRESS NOTES
SUBJECTIVE:   CC: Lorena Sanchez is an 67 year old woman who presents for preventive health visit.           Patient has been advised of split billing requirements and indicates understanding: Yes  HPI      Effexor for Hot flashes      Acyclovir  - just had one a month ago  Dermatologist fills this  [facility]    Allergies   Seasonal    DEXA normal 2013 - will repeat next year    Retired in August.       The 10-year ASCVD risk score (Cheikh MELGOZA, et al., 2019) is: 7.2%    Values used to calculate the score:      Age: 67 years      Sex: Female      Is Non- : No      Diabetic: No      Tobacco smoker: No      Systolic Blood Pressure: 126 mmHg      Is BP treated: No      HDL Cholesterol: 53 mg/dL      Total Cholesterol: 232 mg/dL        Today's PHQ-2 Score:   PHQ-2 ( 1999 Pfizer) 11/17/2022   Q1: Little interest or pleasure in doing things 0   Q2: Feeling down, depressed or hopeless 0   PHQ-2 Score 0   PHQ-2 Total Score (12-17 Years)- Positive if 3 or more points; Administer PHQ-A if positive -       Abuse: Current or Past (Physical, Sexual or Emotional) - No  Do you feel safe in your environment? 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     Allergic rhinitis, seasonal     ACP (advance care planning)     Health Care Home     Subclinical hypothyroidism     Elevated fasting glucose     Mixed hyperlipidemia     Menopausal syndrome (hot flashes)     Past Surgical History:   Procedure Laterality Date     BIOPSY BREAST NEEDLE  LOCALIZATION  03/16/2012    Procedure:BIOPSY BREAST NEEDLE LOCALIZATION; Left Breast Wire Localized Excisional biopsy; Surgeon:BEN RAWLS; Location:[facility]     BREAST BIOPSY, RT/LT      left     COLONOSCOPY N/A 08/28/2017    Procedure: COMBINED COLONOSCOPY, SINGLE OR MULTIPLE BIOPSY/POLYPECTOMY BY BIOPSY;  colonoscopy with polypectomy by cold jumbo forcep;  Surgeon: Tiera Vazquez MD;  Location: [facility]     LUMPECTOMY BREAST Left 2012     ROTATOR CUFF REPAIR RT/LT Right 11/2021       Social History     Tobacco Use     Smoking status: Never     Smokeless tobacco: Never   Substance Use Topics     Alcohol use: No     Family History   Problem Relation Age of Onset     Diabetes Type 2  Mother      Heart Failure Father      Hyperlipidemia Father      Cardiac Sudden Death Father      Parkinsonism Maternal Grandmother      Hyperlipidemia Brother      Prostate Cancer Brother 60     No Known Problems Brother      No Known Problems Brother      Asthma Daughter      Colon Cancer No family hx of          Current Outpatient Medications   Medication Sig Dispense Refill     acyclovir (ZOVIRAX) 200 MG capsule        calcium carbonate (OS-JESUS 500 MG [place]. [place]) 500 MG tablet Take 500 mg by mouth 2 times daily       cetirizine (ZYRTEC) 10 MG tablet Take 10 mg by mouth       cholecalciferol 25 MCG (1000 UT) TABS Take 1,000 Units by mouth       mometasone (NASONEX) 50 MCG/ACT nasal spray 2 sprays       multivitamin, therapeutic with minerals (MULTI-VITAMIN) TABS Take 1 tablet by mouth daily       venlafaxine (EFFEXOR XR) 37.5 MG 24 hr capsule Take 1 capsule (37.5 mg) by mouth daily 90 capsule 3     vitamin B-12 (CYANOCOBALAMIN) 1000 MCG tablet Take 1,000 mcg by mouth       Allergies   Allergen Reactions     Penicillins Nausea and Vomiting     Sulfa Drugs Hives     Recent Labs   Lab Test 11/17/22  0000 11/15/21  1523 11/10/21  0926 11/10/21  0904 11/10/21  0000 07/02/21  1016 07/02/21  0816 07/02/21  0000 10/07/20  0000  10/07/20  0000   A1C  --   --   --  5.9  --   --  6.0  --   --  6.0*     --   --   --  161*  --   --  152*  --  116*   HDL 53  --   --   --  64  --   --  55  --  52   TRIG 291*  --   --   --  261*  --   --  277*  --  375*   CR 0.78 0.80  --   --   --   --   --  0.82   < >  --    POTASSIUM 4.87 4.65  --   --   --   --   --  4.57   < >  --    TSH  --   --  3.55  --   --  4.61*  --   --   --  4.850*    < > = values in this interval not displayed.                           Past Medical History:   Diagnosis Date     DCIS (ductal carcinoma in situ) 03/01/2012    lt breast      Elevated LFTs 11/19/2020     Pneumonia due to infectious organism     early 2000s     PONV (postoperative nausea and vomiting)       Past Surgical History:   Procedure Laterality Date     BIOPSY BREAST NEEDLE LOCALIZATION  03/16/2012    Procedure:BIOPSY BREAST NEEDLE LOCALIZATION; Left Breast Wire Localized Excisional biopsy; Surgeon:BEN RAWLS; Location:[facility]     BREAST BIOPSY, RT/LT      left     COLONOSCOPY N/A 08/28/2017    Procedure: COMBINED COLONOSCOPY, SINGLE OR MULTIPLE BIOPSY/POLYPECTOMY BY BIOPSY;  colonoscopy with polypectomy by cold jumbo forcep;  Surgeon: Tiera Vazquez MD;  Location: [facility]     LUMPECTOMY BREAST Left 2012     ROTATOR CUFF REPAIR RT/LT Right 11/2021       Review of Systems  CONSTITUTIONAL: NEGATIVE for fever, chills, change in weight  INTEGUMENTARY/SKIN: NEGATIVE for worrisome rashes, moles or lesions  EYES: NEGATIVE for vision changes or irritation  ENT: NEGATIVE for ear, mouth and throat problems  RESP: NEGATIVE for significant cough or SOB  BREAST: NEGATIVE for masses, tenderness or discharge  CV: NEGATIVE for chest pain, palpitations or peripheral edema  GI: NEGATIVE for nausea, abdominal pain, heartburn, or change in bowel habits  : NEGATIVE for unusual urinary or vaginal symptoms. No vaginal bleeding.  MUSCULOSKELETAL: NEGATIVE for significant arthralgias or myalgia  NEURO: NEGATIVE for  "weakness, dizziness or paresthesias  PSYCHIATRIC: NEGATIVE for changes in mood or affect      OBJECTIVE:   /82 (BP Location: Right arm, Patient Position: Sitting, Cuff Size: Adult Regular)   Pulse 73   Temp 97.8  F (36.6  C) (Temporal)   Ht 1.575 m (5' 2\")   Wt 63.7 kg (140 lb 6.4 oz)   LMP 12/10/2011   SpO2 98%   BMI 25.68 kg/m    Physical Exam  GENERAL APPEARANCE: healthy, alert and no distress  EYES: Eyes grossly normal to inspection, PERRL and conjunctivae and sclerae normal  HENT: ear canals and TM's normal, nose and mouth without ulcers or lesions, oropharynx clear and oral mucous membranes moist  NECK: no adenopathy, no asymmetry, masses, or scars and thyroid normal to palpation  RESP: lungs clear to auscultation - no rales, rhonchi or wheezes  BREAST: normal without masses, tenderness or nipple discharge and no palpable axillary masses or adenopathy  CV: regular rate and rhythm, normal S1 S2, no S3 or S4, no murmur, click or rub, no peripheral edema and peripheral pulses strong  ABDOMEN: soft, nontender, no hepatosplenomegaly, no masses and bowel sounds normal  MS: no musculoskeletal defects are noted and gait is age appropriate without ataxia  SKIN: no suspicious lesions or rashes  NEURO: Normal strength and tone, sensory exam grossly normal, mentation intact and speech normal  PSYCH: mentation appears normal and affect normal/bright    Diagnostic Test Results:  Labs reviewed in Epic    ASSESSMENT/PLAN:   Lorena was seen today for physical and recheck medication.    Diagnoses and all orders for this visit:    Encounter for gynecological examination without abnormal finding  -     VENOUS COLLECTION    Menopausal syndrome (hot flashes)  -     venlafaxine (EFFEXOR XR) 37.5 MG 24 hr capsule; Take 1 capsule (37.5 mg) by mouth daily    Mixed hyperlipidemia  -     Lipid Panel (BFP)  -     Comprehensive Metobolic Panel (BFP)  -     VENOUS COLLECTION    ASCVD score is 7.2% - defer Lipitor - recheck next " "year    Elevated fasting glucose  -     Comprehensive Metobolic Panel (BFP)  -     VENOUS COLLECTION    Other orders  -     PNEUMOCOCCAL 20 VALENT CONJUGATE (PREVNAR 20)        Patient has been advised of split billing requirements and indicates understanding: Yes    COUNSELING:  Reviewed preventive health counseling, as reflected in patient instructions    Estimated body mass index is 25.68 kg/m  as calculated from the following:    Height as of this encounter: 1.575 m (5' 2\").    Weight as of this encounter: 63.7 kg (140 lb 6.4 oz).    Weight management plan: Discussed healthy diet and exercise guidelines    She reports that she has never smoked. She has never used smokeless tobacco.      Counseling Resources:  ATP IV Guidelines  Pooled Cohorts Equation Calculator  Breast Cancer Risk Calculator  BRCA-Related Cancer Risk Assessment: FHS-7 Tool  FRAX Risk Assessment  ICSI Preventive Guidelines  Dietary Guidelines for Americans, 2010  USDA's MyPlate  ASA Prophylaxis  Lung CA Screening    SKYLAR Mckeon  [facility]    "

## 2023-01-16 ENCOUNTER — HOSPITAL ENCOUNTER (OUTPATIENT)
Dept: MAMMOGRAPHY | Facility: CLINIC | Age: 68
Discharge: HOME | End: 2023-01-16
Attending: OBSTETRICS & GYNECOLOGY | Admitting: OBSTETRICS & GYNECOLOGY
Payer: MEDICARE

## 2023-01-16 DIAGNOSIS — Z12.31 VISIT FOR SCREENING MAMMOGRAM: ICD-10-CM

## 2023-01-16 PROCEDURE — 77067 SCR MAMMO BI INCL CAD: CPT

## 2023-11-09 DIAGNOSIS — N95.1 MENOPAUSAL SYNDROME (HOT FLASHES): ICD-10-CM

## 2023-11-10 RX ORDER — VENLAFAXINE HYDROCHLORIDE 37.5 MG/1
37.5 CAPSULE, EXTENDED RELEASE ORAL DAILY
Qty: 10 CAPSULE | Refills: 0 | Status: SHIPPED | OUTPATIENT
Start: 2023-11-10 | End: 2023-11-20

## 2023-11-10 NOTE — TELEPHONE ENCOUNTER
Lorena Sanchez is requesting a refill of:    Pending Prescriptions:                       Disp   Refills    venlafaxine (EFFEXOR XR) 37.5 MG 24 hr ca*10 cap*0            Sig: TAKE 1 CAPSULE BY MOUTH EVERY DAY    Pt has OV on 11/20/23

## 2023-11-20 ENCOUNTER — OFFICE VISIT (OUTPATIENT)
Dept: FAMILY MEDICINE | Facility: CLINIC | Age: 68
End: 2023-11-20

## 2023-11-20 VITALS
HEIGHT: 62 IN | TEMPERATURE: 98.7 F | SYSTOLIC BLOOD PRESSURE: 126 MMHG | DIASTOLIC BLOOD PRESSURE: 80 MMHG | OXYGEN SATURATION: 97 % | HEART RATE: 72 BPM | WEIGHT: 141 LBS | BODY MASS INDEX: 25.95 KG/M2

## 2023-11-20 DIAGNOSIS — Z13.0 SCREENING FOR DEFICIENCY ANEMIA: ICD-10-CM

## 2023-11-20 DIAGNOSIS — Z00.00 MEDICARE ANNUAL WELLNESS VISIT, INITIAL: Primary | ICD-10-CM

## 2023-11-20 DIAGNOSIS — Z13.29 SCREENING FOR THYROID DISORDER: ICD-10-CM

## 2023-11-20 DIAGNOSIS — N95.1 MENOPAUSAL SYNDROME (HOT FLASHES): ICD-10-CM

## 2023-11-20 DIAGNOSIS — Z00.00 WELL WOMAN EXAM WITHOUT GYNECOLOGICAL EXAM: ICD-10-CM

## 2023-11-20 DIAGNOSIS — Z13.820 SCREENING FOR OSTEOPOROSIS: ICD-10-CM

## 2023-11-20 DIAGNOSIS — Z13.228 SCREENING FOR METABOLIC DISORDER: ICD-10-CM

## 2023-11-20 DIAGNOSIS — Z13.220 SCREENING FOR LIPID DISORDERS: ICD-10-CM

## 2023-11-20 LAB
ALBUMIN SERPL-MCNC: 4.4 G/DL (ref 3.6–5.1)
ALBUMIN/GLOB SERPL: 1.8 {RATIO} (ref 1–2.5)
ALP SERPL-CCNC: 69 U/L (ref 33–130)
ALT 1742-6: 35 U/L (ref 0–32)
AST 1920-8: 23 U/L (ref 0–35)
BILIRUB SERPL-MCNC: 0.6 MG/DL (ref 0.2–1.2)
BUN SERPL-MCNC: 16 MG/DL (ref 7–25)
BUN/CREATININE RATIO: 20.3 (ref 6–32)
CALCIUM SERPL-MCNC: 9.2 MG/DL (ref 8.6–10.3)
CHLORIDE SERPLBLD-SCNC: 103.8 MMOL/L (ref 98–110)
CHOLEST SERPL-MCNC: 223 MG/DL (ref 0–199)
CHOLEST/HDLC SERPL: 4 {RATIO} (ref 0–5)
CO2 SERPL-SCNC: 26.3 MMOL/L (ref 20–32)
CREAT SERPL-MCNC: 0.79 MG/DL (ref 0.6–1.3)
ERYTHROCYTE [DISTWIDTH] IN BLOOD BY AUTOMATED COUNT: 12.5 %
GLOBULIN, CALCULATED - QUEST: 2.5 (ref 1.9–3.7)
GLUCOSE SERPL-MCNC: 97 MG/DL (ref 60–99)
HCT VFR BLD AUTO: 43.5 % (ref 35–47)
HDLC SERPL-MCNC: 52 MG/DL (ref 40–150)
HEMOGLOBIN: 14.2 G/DL (ref 11.7–15.7)
LDLC SERPL CALC-MCNC: 134 MG/DL (ref 0–130)
MCH RBC QN AUTO: 29 PG (ref 26–33)
MCHC RBC AUTO-ENTMCNC: 32.6 G/DL (ref 31–36)
MCV RBC AUTO: 88.8 FL (ref 78–100)
PLATELET COUNT - QUEST: 263 10^9/L (ref 150–375)
POTASSIUM SERPL-SCNC: 4.77 MMOL/L (ref 3.5–5.3)
PROT SERPL-MCNC: 6.9 G/DL (ref 6.1–8.1)
RBC # BLD AUTO: 4.9 10*12/L (ref 3.8–5.2)
SODIUM SERPL-SCNC: 138.4 MMOL/L (ref 135–146)
TRIGL SERPL-MCNC: 185 MG/DL (ref 0–149)
WBC # BLD AUTO: 5.6 10*9/L (ref 4–11)

## 2023-11-20 PROCEDURE — 80053 COMPREHEN METABOLIC PANEL: CPT

## 2023-11-20 PROCEDURE — 36415 COLL VENOUS BLD VENIPUNCTURE: CPT

## 2023-11-20 PROCEDURE — 80061 LIPID PANEL: CPT

## 2023-11-20 PROCEDURE — 85027 COMPLETE CBC AUTOMATED: CPT

## 2023-11-20 PROCEDURE — 99397 PER PM REEVAL EST PAT 65+ YR: CPT | Mod: 25

## 2023-11-20 PROCEDURE — G0438 PPPS, INITIAL VISIT: HCPCS

## 2023-11-20 RX ORDER — VENLAFAXINE HYDROCHLORIDE 37.5 MG/1
37.5 CAPSULE, EXTENDED RELEASE ORAL DAILY
Qty: 90 CAPSULE | Refills: 3 | Status: SHIPPED | OUTPATIENT
Start: 2023-11-20

## 2023-11-20 ASSESSMENT — PATIENT HEALTH QUESTIONNAIRE - PHQ9: SUM OF ALL RESPONSES TO PHQ QUESTIONS 1-9: 1

## 2023-11-20 NOTE — PROGRESS NOTES
SUBJECTIVE:   Lorena is a 68 year old, presenting for the following:  Physical (Fasting CPX)      Are you in the first 12 months of your Medicare coverage?  No    HPI    Lorena presents for her yearly physical. She does not have any concerns.     Past medical history and daily medications reviewed. Reviewed past medical history, surgical history, family history, and social history below.     Social history:  -Diet: Does weight watchers and tries to follows their guidelines, has a variety of proteins, good amount of fruits and veggies, cheese and yogurt for calcium.   -Water: Drinks a good amount of water throughout the day.   -Exercise: 2-3 times a week, usually goes on walks and then does balance classes at the [facility].  -Sleep: No major concerns, maybe one day a week it is harder for her to fall asleep.   -Daily vitamins: Women one a day multivitamins, vitamin D, etc.   -Dentist: Twice a year.   -Eye doctor: Yearly, wears glasses.   -Smoking: No history.  -Alcohol: None.   - Postmenopausal, on Effexor for hot flashes.      Screenings:  -Immunizations: Up to date on flu, pneumonia, and shingles. Encouraged COVID booster from local pharmacy.   -Lab work: CBC, CMP, TSH, T4, lipid.  -Pap smear: N/A  -Mammogram: Due in Jan 2024, she will call and schedule this.   -Colonoscopy: Up to date, due 2027.  -Dexa: Due, ordered.     Have you ever done Advance Care Planning? (For example, a Health Directive, POLST, or a discussion with a medical provider or your loved ones about your wishes): Yes, patient states has an Advance Care Planning document and will bring a copy to the clinic.    Fall risk  Fallen 2 or more times in the past year?: No  Any fall with injury in the past year?: No    Cognitive Screening   1) Repeat 3 items (Leader, Season, Table)   2) Clock draw: NORMAL  3) 3 item recall: Recalls 3 objects  Results: 3 items recalled: COGNITIVE IMPAIRMENT LESS LIKELY    Do you have sleep apnea, excessive snoring or daytime  drowsiness? : no    Reviewed and updated as needed this visit by clinical staff   Tobacco  Allergies  Meds  Problems  Med Hx  Surg Hx  Fam Hx        Reviewed and updated as needed this visit by Provider   Tobacco     Med Hx  Surg Hx  Fam Hx         Social History     Tobacco Use     Smoking status: Never     Smokeless tobacco: Never   Substance Use Topics     Alcohol use: No     Do you have a current opioid prescription? No  Do you use any other controlled substances or medications that are not prescribed by a provider? None    Current providers sharing in care for this patient include:  Patient Care Team:  Fartun Nicole PA-C as PCP - General (Allergy & Immunology)  Divya Rand MD as Assigned PCP    The following health maintenance items are reviewed in Epic and correct as of today:  Health Maintenance   Topic Date Due     RSV VACCINE (Pregnancy & 60+) (1 - 1-dose 60+ series) Never done     COVID-19 Vaccine (4 - 2023-24 season) 09/01/2023     DTAP/TDAP/TD IMMUNIZATION (2 - Tdap) 11/01/2024     MEDICARE ANNUAL WELLNESS VISIT  11/20/2024     FALL RISK ASSESSMENT  11/20/2024     MAMMO SCREENING  01/16/2025     COLORECTAL CANCER SCREENING  08/28/2027     DEXA  10/30/2028     LIPID  11/20/2028     ADVANCE CARE PLANNING  11/20/2028     PHQ-2 (once per calendar year)  Completed     INFLUENZA VACCINE  Completed     Pneumococcal Vaccine: 65+ Years  Completed     ZOSTER IMMUNIZATION  Completed     IPV IMMUNIZATION  Aged Out     HPV IMMUNIZATION  Aged Out     MENINGITIS IMMUNIZATION  Aged Out     RSV MONOCLONAL ANTIBODY  Aged Out     HEPATITIS C SCREENING  Discontinued     PAP  Discontinued     Lab work is in process.    Mammogram Screening: Mammogram Screening: Recommended mammography every 1-2 years with patient discussion and risk factor consideration.    History of abnormal Pap smear: NO - age 65.    Any new diagnosis of family breast, ovarian, or bowel cancer? No    FHS-7:       1/14/2022    10:36 AM  "1/16/2023     1:30 PM   Breast CA Risk Assessment (FHS-7)   Did any of your first-degree relatives have breast or ovarian cancer? No No   Did any of your relatives have bilateral breast cancer? No No   Did any man in your family have breast cancer? No No   Did any woman in your family have breast and ovarian cancer? No No   Did any woman in your family have breast cancer before age 50 y? No No   Do you have 2 or more relatives with breast and/or ovarian cancer? No No   Do you have 2 or more relatives with breast and/or bowel cancer? No No     Mammogram Screening: Recommended mammography every 1-2 years with patient discussion and risk factor consideration. Pertinent mammograms are reviewed under the imaging tab.    Review of Systems  Constitutional, HEENT, cardiovascular, pulmonary, GI, , musculoskeletal, neuro, skin, endocrine and psych systems are negative, except as otherwise noted.    OBJECTIVE:   /80 (BP Location: Right arm, Patient Position: Sitting, Cuff Size: Adult Large)   Pulse 72   Temp 98.7  F (37.1  C) (Temporal)   Ht 1.575 m (5' 2\")   Wt 64 kg (141 lb)   LMP 12/10/2011   SpO2 97%   BMI 25.79 kg/m   Estimated body mass index is 25.79 kg/m  as calculated from the following:    Height as of this encounter: 1.575 m (5' 2\").    Weight as of this encounter: 64 kg (141 lb).     Physical Exam  GENERAL APPEARANCE: healthy, alert and no distress  EYES: Eyes grossly normal to inspection, PERRL and conjunctivae and sclerae normal  HENT: ear canals and TM's normal, nose and mouth without ulcers or lesions, oropharynx clear and oral mucous membranes moist  NECK: no adenopathy, no asymmetry, masses, or scars and thyroid normal to palpation  RESP: lungs clear to auscultation - no rales, rhonchi or wheezes  BREAST: normal without masses, tenderness or nipple discharge and no palpable axillary masses or adenopathy  CV: regular rate and rhythm, normal S1 S2, no S3 or S4, no murmur, click or rub, no " peripheral edema and peripheral pulses strong  ABDOMEN: soft, nontender, no hepatosplenomegaly, no masses and bowel sounds normal   (female): deferred, no concerns  MS: no musculoskeletal defects are noted and gait is age appropriate without ataxia  SKIN: no suspicious lesions or rashes  NEURO: Normal strength and tone, sensory exam grossly normal, mentation intact and speech normal  PSYCH: mentation appears normal and affect normal/bright    Diagnostic Test Results:  Labs reviewed in Epic  Results for orders placed or performed in visit on 11/20/23 (from the past 24 hour(s))   Hemogram Platelet ([facility])   Result Value Ref Range    WBC 5.6 4.0 - 11 10*9/L    RBC Count 4.90 3.8 - 5.2 10*12/L    Hemoglobin 14.2 11.7 - 15.7 g/dL    Hematocrit 43.5 35.0 - 47.0 %    MCV 88.8 78 - 100 fL    MCH 29.0 26 - 33 pg    MCHC 32.6 31 - 36 g/dL    RDW 12.5 %    Platelet Count 263 150 - 375 10^9/L   Comprehensive Metobolic Panel ([facility]FP)   Result Value Ref Range    Carbon Dioxide 26.3 20 - 32 mmol/L    Creatinine 0.79 0.60 - 1.30 mg/dL    Glucose 97 60 - 99 mg/dL    Sodium 138.4 135 - 146 mmol/L    Potassium 4.77 3.5 - 5.3 mmol/L    Chloride 103.8 98 - 110 mmol/L    Protein Total 6.9 6.1 - 8.1 g/dL    Albumin 4.4 3.6 - 5.1 g/dL    Alkaline Phosphatase 69 33 - 130 U/L    ALT 35 (A) 0 - 32 U/L    AST 23 0 - 35 U/L    Bilirubin Total 0.6 0.2 - 1.2 mg/dL    Urea Nitrogen 16 7 - 25 mg/dL    Calcium 9.2 8.6 - 10.3 mg/dL    BUN/Creatinine Ratio 20.3 6 - 32    Globulin Calculated 2.5 1.9 - 3.7    A/G Ratio 1.8 1 - 2.5   Lipid Panel (BFP)   Result Value Ref Range    Cholesterol 223 (A) 0 - 199 mg/dL    Triglycerides 185 (A) 0 - 149 mg/dL    HDL Cholesterol 52 40 - 150 mg/dL    LDL Cholesterol Direct 134 (A) 0 - 130 mg/dL    Cholesterol/HDL Ratio 4 0 - 5       ASSESSMENT / PLAN:     (Z00.00) Medicare annual wellness visit, initial  (primary encounter diagnosis)    (Z00.00) Well woman exam without gynecological exam  Comment: Well woman,  encouraged healthy eating habits, daily exercise, daily multivitamins, screenings, immunizations, etc.     (N95.1) Menopausal syndrome (hot flashes)  Comment: Well controlled, refills for Effexor sent.   Plan: venlafaxine (EFFEXOR XR) 37.5 MG 24 hr capsule    (Z13.0) Screening for deficiency anemia  Comment: Labs pending, patient will be notified if any concerning results.   Plan: VENOUS COLLECTION, Hemogram Platelet (BFP)    (Z13.228) Screening for metabolic disorder  Comment: Labs pending, patient will be notified if any concerning results.   Plan: VENOUS COLLECTION, Comprehensive Metobolic         Panel (BFP)    (Z13.29) Screening for thyroid disorder  Comment: Labs pending, patient will be notified if any concerning results.   Plan: VENOUS COLLECTION, TSH ([facility]), T4 FREE ([facility])    (Z13.220) Screening for lipid disorders  Comment: Labs pending, patient will be notified if any concerning results.   Plan: VENOUS COLLECTION, Lipid Panel (BFP)    (Z13.820) Screening for osteoporosis  Comment: Order placed.   Plan: Dexa hip/pelvis/spine*, Radiology Referral    Patient has been advised of split billing requirements and indicates understanding: Yes    COUNSELING:  Reviewed preventive health counseling, as reflected in patient instructions       Regular exercise       Healthy diet/nutrition       Vision screening       Dental care       Fall risk prevention       Alcohol Use        Osteoporosis prevention/bone health       Colon cancer screening       Advanced Planning     She reports that she has never smoked. She has never used smokeless tobacco.    Appropriate preventive services were discussed with this patient, including applicable screening as appropriate for fall prevention, nutrition, physical activity, Tobacco-use cessation, weight loss and cognition.  Checklist reviewing preventive services available has been given to the patient.    Reviewed patients plan of care and provided an AVS. The Basic Care Plan  (routine screening as documented in Health Maintenance) for Lorena meets the Care Plan requirement. This Care Plan has been established and reviewed with the Patient.          Fartun Nicole PA-C  [facility]    Identified Health Risks:    I have reviewed Opioid Use Disorder and Substance Use Disorder risk factors and made any needed referrals.

## 2023-11-20 NOTE — NURSING NOTE
Chief Complaint   Patient presents with    Physical     Fasting CPX    Pre-visit Screening:  Immunizations:  up to date  Colonoscopy:  is up to date  Mammogram: is up to date  Asthma Action Test/Plan:  na  PHQ9:  updated at appointment  GAD7:  updated at appointment  Questioned patient about current smoking habits Pt. has never smoked.  Ok to leave detailed message on voice mail for today's visit only yes, phone # [phone]

## 2023-11-20 NOTE — PROGRESS NOTES
Lorena Sanchez is a 68 year old female who presents for Medicare Annual Wellness Visit.    Current providers caring for this patient include:  Patient Care Team:  Fartun Nicole PA-C as PCP - General (Allergy & Immunology)  Divya Rand MD as Assigned PCP    Complete Medical and Social history reviewed with patient, outlined below.    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     Allergic rhinitis, seasonal     ACP (advance care planning)     Health Care Home     Subclinical hypothyroidism     Elevated fasting glucose     Mixed hyperlipidemia     Menopausal syndrome (hot flashes)       Past Medical History:   Diagnosis Date     DCIS (ductal carcinoma in situ) 03/01/2012    lt breast      Elevated LFTs 11/19/2020     Pneumonia due to infectious organism     early 2000s     PONV (postoperative nausea and vomiting)        Past Surgical History:   Procedure Laterality Date     BIOPSY BREAST NEEDLE LOCALIZATION  03/16/2012    Procedure:BIOPSY BREAST NEEDLE LOCALIZATION; Left Breast Wire Localized Excisional biopsy; Surgeon:BEN RAWLS; Location:[facility] OR     BREAST BIOPSY, RT/LT      left     COLONOSCOPY N/A 08/28/2017    Procedure: COMBINED COLONOSCOPY, SINGLE OR MULTIPLE BIOPSY/POLYPECTOMY BY BIOPSY;  colonoscopy with polypectomy by cold jumbo forcemarcy;  Surgeon: Tiera Vazquez MD;  Location: [facility]     LUMPECTOMY BREAST Left 2012     ROTATOR CUFF REPAIR RT/LT Right 11/2021       Family History   Problem Relation Age of Onset     Diabetes Type 2  Mother      Macular Degeneration Mother      Heart Failure Father      Hyperlipidemia Father      Cardiac Sudden Death Father      Hyperlipidemia Brother      Prostate Cancer Brother 60     No Known Problems Brother      No Known Problems Brother      Parkinsonism Maternal Grandmother      Asthma Daughter      Colon Cancer No family hx of      Ovarian Cancer No family hx of      Lung  "Cancer No family hx of        Social History     Tobacco Use     Smoking status: Never     Smokeless tobacco: Never   Substance Use Topics     Alcohol use: No       Diet: regular, low salt/low fat  Physical Activity: patient exercises 2-3 times weekly  Depression Screen:    Over the past 2 weeks, patient has felt down, depressed, or hopeless:  No    Over the past 2 weeks, patient has felt little interest or pleasure in doing things: No    Functional ability/Safety screen:  Up and go test (able to get up and walk longer than 30 seconds): Passed  Patient needs assistance with: nothing  Patient's home has the following possible safety concerns: none identified  Patient has concerns about her hearing:  No  Cognitive Screen  Patient repeats three objects (ball, flag, tree)      Clock drawing test:   NORMAL  Recalls three objects after 3 minutes (ball,flag,tree):                                                                                               recalls 3 objects (3 points)    Physical Exam:  /80 (BP Location: Right arm, Patient Position: Sitting, Cuff Size: Adult Large)   Pulse 72   Temp 98.7  F (37.1  C) (Temporal)   Ht 1.575 m (5' 2\")   Wt 64 kg (141 lb)   LMP 12/10/2011   SpO2 97%   BMI 25.79 kg/m     Body mass index is 25.79 kg/m .     See physical exam in provider's note below.     End of Life Planning:   Patient currently has an advanced directive: Patient will bring her advanced directive to clinic at her next visit.     Education/Counseling:   Based on review of the above information, the following items were addressed:      Obesity - appropriate counseling on diet, exercise, etc.    Appropriate preventive services were discussed with this patient, including applicable screening as appropriate for cardiovascular disease, diabetes, osteopenia/osteoporosis, and glaucoma.  As appropriate for age/gender, discussed screening for colorectal cancer, prostate cancer, breast cancer, and cervical " cancer.   Checklist reviewing preventive services available has been given to the patient.

## 2023-11-20 NOTE — PATIENT INSTRUCTIONS
Thanks for coming in today Lorena.     I will send you a message on my chart once I have all of your lab results in.     Recommend trying Melatonin 2.5 mg over the counter to see if this will help with sleep. There also 5 mg, 10 mg, and 12.5 mg doses over the counter but I would try the 2.5 mg first to see if this will help.    I have also placed the DEXA scan below, someone should contact you within the next week to get this scheduled.    Please let me know if you have any questions or concerns.

## 2023-11-21 LAB
T4, FREE, NON-DIALYSIS - QUEST: 1 NG/DL (ref 0.8–1.8)
TSH SERPL-ACNC: 2.28 MIU/L (ref 0.4–4.5)

## 2024-01-18 ENCOUNTER — HOSPITAL ENCOUNTER (OUTPATIENT)
Dept: MAMMOGRAPHY | Facility: CLINIC | Age: 69
Discharge: HOME | End: 2024-01-18
Payer: MEDICARE

## 2024-01-18 ENCOUNTER — ANCILLARY PROCEDURE (OUTPATIENT)
Dept: BONE DENSITY | Facility: CLINIC | Age: 69
End: 2024-01-18
Payer: MEDICARE

## 2024-01-18 DIAGNOSIS — Z12.31 VISIT FOR SCREENING MAMMOGRAM: ICD-10-CM

## 2024-01-18 DIAGNOSIS — Z13.820 SCREENING FOR OSTEOPOROSIS: ICD-10-CM

## 2024-01-18 PROCEDURE — 77063 BREAST TOMOSYNTHESIS BI: CPT

## 2024-01-18 PROCEDURE — 77080 DXA BONE DENSITY AXIAL: CPT | Mod: TC | Performed by: PHYSICIAN ASSISTANT

## 2024-01-29 ENCOUNTER — OFFICE VISIT (OUTPATIENT)
Dept: FAMILY MEDICINE | Facility: CLINIC | Age: 69
End: 2024-01-29

## 2024-01-29 VITALS
TEMPERATURE: 97.6 F | OXYGEN SATURATION: 97 % | SYSTOLIC BLOOD PRESSURE: 122 MMHG | WEIGHT: 142 LBS | HEART RATE: 81 BPM | BODY MASS INDEX: 25.97 KG/M2 | DIASTOLIC BLOOD PRESSURE: 78 MMHG

## 2024-01-29 DIAGNOSIS — H10.33 ACUTE BACTERIAL CONJUNCTIVITIS OF BOTH EYES: Primary | ICD-10-CM

## 2024-01-29 PROCEDURE — 99213 OFFICE O/P EST LOW 20 MIN: CPT | Performed by: PHYSICIAN ASSISTANT

## 2024-01-29 RX ORDER — MOXIFLOXACIN 5 MG/ML
1 SOLUTION/ DROPS OPHTHALMIC 3 TIMES DAILY
Qty: 3 ML | Refills: 0 | Status: SHIPPED | OUTPATIENT
Start: 2024-01-29

## 2024-01-29 NOTE — NURSING NOTE
Chief Complaint   Patient presents with    Eye Problem     Pt here with possible pink eye. Noticed it Friday morning. Eye was all gunky when she woke up since Friday. Just in the Left eye for now, the right is starting to get red.    Pre-visit Screening:  Immunizations:  up to date  Colonoscopy:  is up to date  Mammogram: is up to date  Asthma Action Test/Plan: na  PHQ9:  na  GAD7:  na  Questioned patient about current smoking habits Pt. has never smoked.  Ok to leave detailed message on voice mail for today's visit only yes, phone # [phone]

## 2024-01-29 NOTE — PROGRESS NOTES
Assessment & Plan     Acute bacterial conjunctivitis of both eyes-concerns infection has spread to R eye, will treat RADHA eyes and await improvement  Wash hands frequently, keep eyes clean  Treat for 1 week, 1 drop TID  - moxifloxacin (VIGAMOX) 0.5 % ophthalmic solution  Dispense: 3 mL; Refill: 0      Follow up as needed    No follow-ups on file.    Subjective   Lorena is a 68 year old, presenting for the following health issues:  Eye Problem (Pt here with possible pink eye. Noticed it Friday morning. Eye was all gunky when she woke up since Friday. Just in the Left eye for now, the right is starting to get red.)    HPI     Lingering cold/cough symptoms for a few weeks-dry cough, fatigue    Eye(s) Problem  Onset/Duration: 3 days  Description:   Location: Right  and Left  Pain: no pain, irritation/itching  Redness: YES  Accompanying Signs & Symptoms:  Discharge/mattering: YES  Swelling: No  Visual changes: No  Fever: No  Nasal Congestion: YES  Bothered by bright lights: No  History:  Trauma: No  Foreign body exposure: No  Wearing contacts: No  Precipitating or alleviating factors: None  Therapies tried and outcome: washing eyes        Review of Systems  Constitutional, HEENT, cardiovascular, pulmonary, gi and gu systems are negative, except as otherwise noted.      Objective    /78 (BP Location: Right arm, Patient Position: Sitting, Cuff Size: Adult Regular)   Pulse 81   Temp 97.6  F (36.4  C) (Temporal)   Wt 64.4 kg (142 lb)   LMP 12/10/2011   SpO2 97%   BMI 25.97 kg/m    Body mass index is 25.97 kg/m .  Physical Exam   GENERAL: alert and no distress  EYES: PERRL, EOMI, and conjunctiva/corneas- conjunctival injection each eye, sclera erythematous and bloodshot RADHA  NECK: no adenopathy, no asymmetry, masses, or scars  RESP: lungs clear to auscultation - no rales, rhonchi or wheezes  CV: regular rate and rhythm, normal S1 S2, no S3 or S4, no murmur, click or rub, no peripheral edema  ABDOMEN: soft,  nontender, no hepatosplenomegaly, no masses and bowel sounds normal  MS: no gross musculoskeletal defects noted, no edema  NEURO: Normal strength and tone, mentation intact and speech normal  PSYCH: mentation appears normal, affect normal/bright            Signed Electronically by: Parminder Hairston PA-C

## 2024-06-17 PROBLEM — Z76.89 HEALTH CARE HOME: Status: RESOLVED | Noted: 2020-11-19 | Resolved: 2024-06-17

## 2024-11-15 DIAGNOSIS — N95.1 MENOPAUSAL SYNDROME (HOT FLASHES): ICD-10-CM

## 2024-11-15 RX ORDER — VENLAFAXINE HYDROCHLORIDE 37.5 MG/1
37.5 CAPSULE, EXTENDED RELEASE ORAL DAILY
Qty: 10 CAPSULE | Refills: 0 | Status: SHIPPED | OUTPATIENT
Start: 2024-11-15

## 2024-11-15 NOTE — TELEPHONE ENCOUNTER
Prescription sent in.     Fartun Nicole PA-C  [facility]

## 2024-11-15 NOTE — TELEPHONE ENCOUNTER
Lorena Sanchez is requesting a refill of:    Pending Prescriptions:                       Disp   Refills    venlafaxine (EFFEXOR XR) 37.5 MG 24 hr ca*10 cap*0            Sig: Take 1 capsule (37.5 mg) by mouth daily.    Pt has OV on 11/25/24

## 2024-11-25 ENCOUNTER — OFFICE VISIT (OUTPATIENT)
Dept: FAMILY MEDICINE | Facility: CLINIC | Age: 69
End: 2024-11-25

## 2024-11-25 VITALS
HEART RATE: 77 BPM | OXYGEN SATURATION: 99 % | BODY MASS INDEX: 25.62 KG/M2 | SYSTOLIC BLOOD PRESSURE: 126 MMHG | TEMPERATURE: 97 F | DIASTOLIC BLOOD PRESSURE: 82 MMHG | HEIGHT: 62 IN | WEIGHT: 139.2 LBS

## 2024-11-25 DIAGNOSIS — E03.8 SUBCLINICAL HYPOTHYROIDISM: ICD-10-CM

## 2024-11-25 DIAGNOSIS — N95.1 MENOPAUSAL SYNDROME (HOT FLASHES): ICD-10-CM

## 2024-11-25 DIAGNOSIS — E78.2 MIXED HYPERLIPIDEMIA: ICD-10-CM

## 2024-11-25 DIAGNOSIS — Z86.19 HISTORY OF COLD SORES: ICD-10-CM

## 2024-11-25 DIAGNOSIS — R73.01 ELEVATED FASTING GLUCOSE: ICD-10-CM

## 2024-11-25 DIAGNOSIS — Z00.00 ENCOUNTER FOR MEDICARE ANNUAL WELLNESS EXAM: Primary | ICD-10-CM

## 2024-11-25 DIAGNOSIS — Z01.419 WELL WOMAN EXAM WITH ROUTINE GYNECOLOGICAL EXAM: ICD-10-CM

## 2024-11-25 LAB
ALBUMIN SERPL-MCNC: 4.7 G/DL (ref 3.6–5.1)
ALP SERPL-CCNC: 84 U/L (ref 33–130)
ALT 1742-6: 30 U/L (ref 0–32)
AST 1920-8: 19 U/L (ref 0–35)
BILIRUB SERPL-MCNC: 0.6 MG/DL (ref 0.2–1.2)
BUN SERPL-MCNC: 17 MG/DL (ref 7–25)
BUN/CREATININE RATIO: 20 (ref 6–32)
CALCIUM SERPL-MCNC: 10.3 MG/DL (ref 8.6–10.3)
CHLORIDE SERPLBLD-SCNC: 101.1 MMOL/L (ref 98–110)
CHOLEST SERPL-MCNC: 226 MG/DL (ref 0–199)
CHOLEST/HDLC SERPL: 4 {RATIO} (ref 0–5)
CO2 SERPL-SCNC: 25 MMOL/L (ref ?–32)
CREAT SERPL-MCNC: 0.86 MG/DL (ref 0.6–1.3)
GLUCOSE SERPL-MCNC: 99 MG/DL (ref 60–99)
HDLC SERPL-MCNC: 53 MG/DL (ref 40–150)
HEMOGLOBIN A1C: 5.7 % (ref 4–5.6)
LDLC SERPL CALC-MCNC: 128 MG/DL (ref 0–129)
POTASSIUM SERPL-SCNC: 5.19 MMOL/L (ref 3.5–5.3)
PROT SERPL-MCNC: 7.5 G/DL (ref 6.1–8.1)
SODIUM SERPL-SCNC: 136.8 MMOL/L (ref 135–146)
TRIGL SERPL-MCNC: 227 MG/DL (ref 0–149)

## 2024-11-25 PROCEDURE — 80053 COMPREHEN METABOLIC PANEL: CPT

## 2024-11-25 PROCEDURE — 99397 PER PM REEVAL EST PAT 65+ YR: CPT

## 2024-11-25 PROCEDURE — 99459 PELVIC EXAMINATION: CPT

## 2024-11-25 PROCEDURE — 80061 LIPID PANEL: CPT

## 2024-11-25 PROCEDURE — G0439 PPPS, SUBSEQ VISIT: HCPCS

## 2024-11-25 PROCEDURE — 36415 COLL VENOUS BLD VENIPUNCTURE: CPT

## 2024-11-25 PROCEDURE — 83036 HEMOGLOBIN GLYCOSYLATED A1C: CPT

## 2024-11-25 RX ORDER — MUPIROCIN 20 MG/G
OINTMENT TOPICAL
COMMUNITY
Start: 2024-10-30 | End: 2024-11-25

## 2024-11-25 RX ORDER — VALACYCLOVIR HYDROCHLORIDE 1 G/1
TABLET, FILM COATED ORAL
COMMUNITY
Start: 2024-10-30

## 2024-11-25 RX ORDER — VENLAFAXINE HYDROCHLORIDE 37.5 MG/1
37.5 CAPSULE, EXTENDED RELEASE ORAL DAILY
Qty: 90 CAPSULE | Refills: 3 | Status: SHIPPED | OUTPATIENT
Start: 2024-11-25

## 2024-11-25 RX ORDER — METRONIDAZOLE 7.5 MG/G
LOTION TOPICAL
COMMUNITY
Start: 2024-10-30

## 2024-11-25 ASSESSMENT — ANXIETY QUESTIONNAIRES
7. FEELING AFRAID AS IF SOMETHING AWFUL MIGHT HAPPEN: NOT AT ALL
2. NOT BEING ABLE TO STOP OR CONTROL WORRYING: NOT AT ALL
1. FEELING NERVOUS, ANXIOUS, OR ON EDGE: NOT AT ALL
GAD7 TOTAL SCORE: 0
5. BEING SO RESTLESS THAT IT IS HARD TO SIT STILL: NOT AT ALL
GAD7 TOTAL SCORE: 0
IF YOU CHECKED OFF ANY PROBLEMS ON THIS QUESTIONNAIRE, HOW DIFFICULT HAVE THESE PROBLEMS MADE IT FOR YOU TO DO YOUR WORK, TAKE CARE OF THINGS AT HOME, OR GET ALONG WITH OTHER PEOPLE: NOT DIFFICULT AT ALL
3. WORRYING TOO MUCH ABOUT DIFFERENT THINGS: NOT AT ALL
6. BECOMING EASILY ANNOYED OR IRRITABLE: NOT AT ALL

## 2024-11-25 ASSESSMENT — PATIENT HEALTH QUESTIONNAIRE - PHQ9
5. POOR APPETITE OR OVEREATING: NOT AT ALL
SUM OF ALL RESPONSES TO PHQ QUESTIONS 1-9: 0

## 2024-11-25 NOTE — PATIENT INSTRUCTIONS
Thanks for coming in today Lorena.     I will send you a my chart message with your lab results.     Tetanus at local pharmacy.     Mammogram scheduled for January.     Please let me know if you have any questions or concerns.

## 2024-11-25 NOTE — PROGRESS NOTES
Preventive Care Visit  [facility], PJAYNA.  Fartun Nicole PA-C, Family Medicine  Nov 25, 2024       SUBJECTIVE:   Lorena is a 69 year old, presenting for the following:  Physical (CPX fasting and med refill ) and Medicare Visit (Annual wellness visit )      HPI    Lorena presents for her yearly physical.      Daily medications: Reviewed.     -Hot flashes: Currently taking Effexor 37.5 mg every morning, denies any side effects. Still having some hot flashes 2-3 times a week, none at night. Well controlled overall.      Concerns: None.      Past medical history and daily medications reviewed. Reviewed past medical history, surgical history, family history, and social history below.     Social history:  -Diet: Tries to eat healthy overall, still does weight watchers, primarily has chicken and fish for protein, good amount of fruits and vegetables, milk, cheese, and yogurt for calcium.   -Water: Drinks a good amount of water throughout the day, also has milk, no coffee or juice.   -Exercise: Very active, balance class once a week at the [facility], will also go on walks at the [employer], gets about 2 miles of walking in per day.   -Sleep: No concerns.   -Daily vitamins: Multivitamin, vitamin D3, vitamin B12.   -Dentist: Twice a year.   -Eye doctor: Annually, wears glasses.   -Smoking: None.   -Alcohol: None.   -LMP: Postmenopausal.      Screenings:  -Immunizations: Encouraged DTAP and COVID booster from local pharmacy.   -Lab work: CMP, lipid, A1C, TSH/T4.   -Pap smear: N/A  -Mammogram: Scheduled for 01/2025.   -Colonoscopy: Up to date, due 08/2027.   -DEXA scan: Up to date, normal bone density.     Social History     Tobacco Use    Smoking status: Never    Smokeless tobacco: Never   Substance Use Topics    Alcohol use: No     Reviewed orders with patient.  Reviewed health maintenance and updated orders accordingly - Yes    Lab work is in process.     Breast Cancer Screening: Any new diagnosis of family  breast, ovarian, or bowel cancer? No     FHS-7:       1/14/2022    10:36 AM 1/16/2023     1:30 PM 1/18/2024    10:18 AM 1/18/2024    10:19 AM   Breast CA Risk Assessment (FHS-7)   Did any of your first-degree relatives have breast or ovarian cancer? No No No No   Did any of your relatives have bilateral breast cancer? No No No    Did any man in your family have breast cancer? No No No    Did any woman in your family have breast and ovarian cancer? No No No    Did any woman in your family have breast cancer before age 50 y? No No No    Do you have 2 or more relatives with breast and/or ovarian cancer? No No No    Do you have 2 or more relatives with breast and/or bowel cancer? No No No      Mammogram Screening - Mammogram every 1-2 years updated in Health Maintenance based on mutual decision making. Pertinent mammograms are reviewed under the imaging tab.    History of abnormal Pap smear: No - age 65 or older with adequate negative prior screening test results (3 consecutive negative cytology results, 2 consecutive negative cotesting results, or 2 consecutive negative HrHPV test results within 10 years, with the most recent test occurring within the recommended screening interval for the test used).     Reviewed and updated as needed this visit by clinical staff   Tobacco  Allergies  Meds   Med Hx  Surg Hx  Fam Hx  Soc Hx      Reviewed and updated as needed this visit by Provider   Tobacco  Allergies  Meds   Med Hx  Surg Hx  Fam Hx  Soc Hx Sexual   Activity        Review of Systems  CONSTITUTIONAL: NEGATIVE for fever, chills, change in weight  INTEGUMENTARY/SKIN: NEGATIVE for worrisome rashes, moles or lesions  EYES: NEGATIVE for vision changes or irritation  ENT/MOUTH: NEGATIVE for ear, mouth and throat problems  RESP: NEGATIVE for significant cough or SOB  BREAST: NEGATIVE for masses, tenderness or discharge  CV: NEGATIVE for chest pain, palpitations or peripheral edema  GI: NEGATIVE for nausea,  "abdominal pain, heartburn, or change in bowel habits  : NEGATIVE for frequency, dysuria, or hematuria  MUSCULOSKELETAL: NEGATIVE for significant arthralgias or myalgia  NEURO: NEGATIVE for weakness, dizziness or paresthesias  ENDOCRINE: NEGATIVE for temperature intolerance, skin/hair changes  HEME: NEGATIVE for bleeding problems  PSYCHIATRIC: NEGATIVE for changes in mood or affect    OBJECTIVE:   /82 (BP Location: Right arm, Patient Position: Sitting, Cuff Size: Adult Regular)   Pulse 77   Temp 97  F (36.1  C) (Temporal)   Ht 1.562 m (5' 1.5\")   Wt 63.1 kg (139 lb 3.2 oz)   LMP 12/10/2011   SpO2 99%   BMI 25.88 kg/m     Estimated body mass index is 25.88 kg/m  as calculated from the following:    Height as of this encounter: 1.562 m (5' 1.5\").    Weight as of this encounter: 63.1 kg (139 lb 3.2 oz).    Physical Exam  GENERAL: alert and no distress  EYES: Eyes grossly normal to inspection, PERRL and conjunctivae and sclerae normal  HENT: ear canals and TM's normal, nose and mouth without ulcers or lesions  NECK: no adenopathy, no asymmetry, masses, or scars  RESP: lungs clear to auscultation - no rales, rhonchi or wheezes  BREAST: normal without masses, tenderness or nipple discharge and no palpable axillary masses or adenopathy  CV: regular rate and rhythm, normal S1 S2, no S3 or S4, no murmur, click or rub, no peripheral edema  ABDOMEN: soft, nontender, no hepatosplenomegaly, no masses and bowel sounds normal   (female) w/bimanual: normal female external genitalia, normal urethral meatus, moderate vaginal atrophy noted, and normal cervix/adnexa/uterus without masses or discharge  MS: no gross musculoskeletal defects noted, no edema  SKIN: no suspicious lesions or rashes  NEURO: Normal strength and tone, mentation intact and speech normal  PSYCH: mentation appears normal, affect normal/bright    Lab work pending.     ASSESSMENT/PLAN:     Encounter for Medicare annual wellness exam  - Completed, see " note above.     Well woman exam with routine gynecological exam  - Lorena is overall doing well today. Encouraged healthy eating habits, daily exercise, reviewed screenings and immunizations, etc.     Menopausal syndrome (hot flashes)  - Well controlled, refills for Effexor 37.5 mg daily sent for one year.   - venlafaxine (EFFEXOR XR) 37.5 MG 24 hr capsule; Take 1 capsule (37.5 mg) by mouth daily.    History of cold sores  - Well controlled, continue Valtrex PRN, will let me know when needing refills.     Mixed hyperlipidemia  - Labs pending, patient will be notified of results.   - VENOUS COLLECTION  - Lipid Panel ([facility])    Elevated fasting glucose  - Labs pending, patient will be notified of results.   - VENOUS COLLECTION  - Comprehensive Metobolic Panel ([facility])  - HEMOGLOBIN A1C ([facility])    Subclinical hypothyroidism  - Labs pending, patient will be notified of results.   - VENOUS COLLECTION  - TSH WITH FREE T4 REFLEX ([facility])    Patient has been advised of split billing requirements and indicates understanding: Yes    Counseling  Reviewed preventive health counseling, as reflected in patient instructions       Regular exercise       Healthy diet/nutrition       Vision screening       Osteoporosis prevention/bone health       Colorectal Cancer Screening       (Shreya)menopause management       Advance Care Planning    She reports that she has never smoked. She has never used smokeless tobacco.            Signed Electronically by: Fartun Nicole PA-C

## 2024-11-25 NOTE — NURSING NOTE
Chief Complaint   Patient presents with    Physical     CPX fasting and med refill     Medicare Visit     Annual wellness visit      Pre-visit Screening:  Immunizations:  up to date  Colonoscopy:  is up to date  Mammogram: is due and to be scheduled by patient for later completion  Asthma Action Test/Plan:    PHQ9:  given  GAD7:  given  Questioned patient about current smoking habits Pt. has never smoked.  Ok to leave detailed message on voice mail for today's visit only yes, phone # [phone] (home)

## 2024-11-25 NOTE — LETTER
2024      Lorena Sanchez  [address] [place] [id number]        To whom this may concern,     Lorena Sanchez (: 1955) is a patient under my care who was seen today for her yearly physical on 24. It was discussed today that she is at her goal weight of being at 140 lbs. If you have any questions or concerns, please call the clinic at the number listed above.       Sincerely,

## 2024-11-25 NOTE — Clinical Note
11/25/2024      Lorena ALONZO Erz  [address] [address] [address] [facility] [id number]        Preventive Care Visit  [facility], [facility].  Fartun Nicole PA-C, Family Medicine  Nov 25, 2024  {Provider  Link to [facility] :785224}     SUBJECTIVE:   Lorena is a 69 year old, presenting for the following:  Physical (CPX fasting and med refill ) and Medicare Visit (Annual wellness visit )      HPI    Lorena presents for her yearly physical.      Daily medications: Reviewed.     -Hot flashes: Currently taking Effexor 37.5 mg every morning, denies any side effects. Still having some hot flashes 2-3 times a week, none at night. Well controlled overall.      Concerns: None.      Past medical history and daily medications reviewed. Reviewed past medical history, surgical history, family history, and social history below.     Social history:  -Diet: Tries to eat healthy overall, still does weight watchers, primarily has chicken and fish for protein, good amount of fruits and vegetables, milk, cheese, and yogurt for calcium.   -Water: Drinks a good amount of water throughout the day, also has milk, no coffee or juice.   -Exercise: Very active, balance class once a week at the [facility], will also go on walks at the [employer] gets about 2 miles of walking in per day.   -Sleep: No concerns.   -Daily vitamins: Multivitamin, vitamin D3, vitamin B12.   -Dentist: Twice a year.   -Eye doctor: Annually, wears glasses.   -Smoking: None.   -Alcohol: None.   -LMP: Postmenopausal.      Screenings:  -Immunizations: Encouraged DTAP and COVID booster from local pharmacy.   -Lab work: CMP, lipid, A1C, TSH/T4.   -Pap smear: N/A  -Mammogram: Scheduled for 01/2025.   -Colonoscopy: Up to date, due 08/2027.   -DEXA scan: Up to date, normal bone density.     {Add if <65 person on Medicare  - Required Questions (Optional):619649}  {Outside tests to abstract? (Optional):130247}    {additional problems to add (Optional):615991}      Social History      Tobacco Use    Smoking status: Never    Smokeless tobacco: Never   Substance Use Topics    Alcohol use: No     {Rooming staff  Click this link to complete the Prescreen if response below is not for today's visit  Alcohol Use Prescreen >3 drinks/day or > 7 drinks/week.  If the prescreen question answer is YES, complete the full AUDIT  :290090}         No data to display            {add AUDIT responses (Optional) (A score of 7 for adult men is an indication of hazardous drinking; a score of 8 or more is an indication of an alcohol use disorder.  A score of 7 or more for adult women is an indication of hazardous drinking or an alchohol use disorder):025845}  Reviewed orders with patient.  Reviewed health maintenance and updated orders accordingly - { :151338}  {Chronicprobdata (optional):009306}    Breast Cancer Screening:    FHS-7:       1/14/2022    10:36 AM 1/16/2023     1:30 PM 1/18/2024    10:18 AM 1/18/2024    10:19 AM   Breast CA Risk Assessment (FHS-7)   Did any of your first-degree relatives have breast or ovarian cancer? No No No No   Did any of your relatives have bilateral breast cancer? No No No    Did any man in your family have breast cancer? No No No    Did any woman in your family have breast and ovarian cancer? No No No    Did any woman in your family have breast cancer before age 50 y? No No No    Do you have 2 or more relatives with breast and/or ovarian cancer? No No No    Do you have 2 or more relatives with breast and/or bowel cancer? No No No      {If any of the questions to the BCRA (FHS-7) are answered yes, consider ordering referral for genetic counseling (Optional) :048758}  {AMB Mammogram Decision Support (Optional) :873735}  Pertinent mammograms are reviewed under the imaging tab.      History of abnormal Pap smear: { :361430}            Reviewed and updated as needed this visit by clinical staff   Tobacco  Allergies  Meds   Med Hx  Surg Hx  Fam Hx  Soc Hx        Reviewed and  "updated as needed this visit by Provider   Tobacco  Allergies  Meds   Med Hx  Surg Hx  Fam Hx  Soc Hx Sexual   Activity        {HISTORY OPTIONS (Optional):318242}  Review of Systems  {ROS Picklists (Optional):867362}    OBJECTIVE:   /82 (BP Location: Right arm, Patient Position: Sitting, Cuff Size: Adult Regular)   Pulse 77   Temp 97  F (36.1  C) (Temporal)   Ht 1.562 m (5' 1.5\")   Wt 63.1 kg (139 lb 3.2 oz)   LMP 12/10/2011   SpO2 99%   BMI 25.88 kg/m     Estimated body mass index is 25.88 kg/m  as calculated from the following:    Height as of this encounter: 1.562 m (5' 1.5\").    Weight as of this encounter: 63.1 kg (139 lb 3.2 oz).  Physical Exam  {Exam List (Optional):322494}    {Diagnostic Test Results (Optional):318548}    ASSESSMENT/PLAN:   {Diag Picklist:449068}    {Patient advised of split billing (Optional):081336}      Counseling  {FEMALE COUNSELING MESSAGES:827788::\"Reviewed preventive health counseling, as reflected in patient instructions\"}        She reports that she has never smoked. She has never used smokeless tobacco.      {Counseling Resources  [place] Preventive Services Task Force  Cholesterol Screening  Health diet/nutrition  Pooled Cohorts Equation Calculator  USDA's MyPlate  ASA Prophylaxis  Lung CA Screening  Osteoporosis prevention/bone health :895618}  {Breast Cancer Risk Calculator  BRCA-Related Cancer Risk Assessment FHS-7 Tool :238547}  Signed Electronically by: Fartun Nicole PA-C      Lorena Sanchez is a 69 year old female who presents for Medicare Annual Wellness Visit.    Current providers caring for this patient include:  Patient Care Team:  Fartun Nicole PA-C as PCP - General (Allergy & Immunology)  Divya Rand MD as Assigned PCP    Complete Medical and Social history reviewed with patient, outlined below.    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    Allergic rhinitis, seasonal "    ACP (advance care planning)    Subclinical hypothyroidism    Elevated fasting glucose    Mixed hyperlipidemia    Menopausal syndrome (hot flashes)       Past Medical History:   Diagnosis Date    DCIS (ductal carcinoma in situ) 03/01/2012    lt breast     Elevated LFTs 11/19/2020    Pneumonia due to infectious organism     early 2000s    PONV (postoperative nausea and vomiting)        Past Surgical History:   Procedure Laterality Date    BIOPSY BREAST NEEDLE LOCALIZATION  03/16/2012    Procedure:BIOPSY BREAST NEEDLE LOCALIZATION; Left Breast Wire Localized Excisional biopsy; Surgeon:BEN RAWLS; Location:[facility] OR    BREAST BIOPSY, RT/LT      left    COLONOSCOPY N/A 08/28/2017    Procedure: COMBINED COLONOSCOPY, SINGLE OR MULTIPLE BIOPSY/POLYPECTOMY BY BIOPSY;  colonoscopy with polypectomy by cold jumbo forcep;  Surgeon: Tiera Vazquez MD;  Location: [facility]    LUMPECTOMY BREAST Left 2012    ROTATOR CUFF REPAIR RT/LT Right 11/2021       Family History   Problem Relation Age of Onset    Diabetes Type 2  Mother     Macular Degeneration Mother     Heart Failure Father     Hyperlipidemia Father     Cardiac Sudden Death Father 85    Hyperlipidemia Brother     Prostate Cancer Brother 60    No Known Problems Brother     No Known Problems Brother     Parkinsonism Maternal Grandmother     Asthma Daughter     Sleep Apnea Daughter     Colon Cancer No family hx of     Ovarian Cancer No family hx of     Lung Cancer No family hx of     Breast Cancer No family hx of        Social History     Tobacco Use    Smoking status: Never    Smokeless tobacco: Never   Substance Use Topics    Alcohol use: No       Diet: regular, low salt/low fat  Physical Activity: walking, balance class at [facility], exercises 3-4x/week, 2 miles/day walking  Depression Screen:    Over the past 2 weeks, patient has felt down, depressed, or hopeless:  No    Over the past 2 weeks, patient has felt little interest or pleasure in doing things: No    Functional  "ability/Safety screen:  Up and go test (able to get up and walk longer than 30 seconds): Passed  Patient needs assistance with: nothing. Fully independent  Patient's home has the following possible safety concerns: none identified  Patient has concerns about her hearing:  No  Cognitive Screen  Patient repeats three objects (ball, flag, tree)      Clock drawing test:   NORMAL  Recalls three objects after 3 minutes (ball,flag,tree):                                                                                               recalls 2 objects (2 points)    Physical Exam:  /82 (BP Location: Right arm, Patient Position: Sitting, Cuff Size: Adult Regular)   Pulse 77   Temp 97  F (36.1  C) (Temporal)   Ht 1.562 m (5' 1.5\")   Wt 63.1 kg (139 lb 3.2 oz)   LMP 12/10/2011   SpO2 99%   BMI 25.88 kg/m     Body mass index is 25.88 kg/m .        {Optional Additional Exam - brief:882152}    End of Life Planning:   Patient currently has an advanced directive: { :464719}    Education/Counseling:   Based on review of the above information, the following items were addressed:  {IPPE Counselin}    Appropriate preventive services were discussed with this patient, including applicable screening as appropriate for cardiovascular disease, diabetes, osteopenia/osteoporosis, and glaucoma.  As appropriate for age/gender, discussed screening for colorectal cancer, prostate cancer, breast cancer, and cervical cancer.   Checklist reviewing preventive services available has been given to the patient.           Sincerely,        Fartun Nicole PA-C    "

## 2024-11-25 NOTE — PROGRESS NOTES
Lorena Sanchez is a 69 year old female who presents for Medicare Annual Wellness Visit.    Current providers caring for this patient include:  Patient Care Team:  Fartun Nicole PA-C as PCP - General (Allergy & Immunology)  Divya Radn MD as Assigned PCP    Complete Medical and Social history reviewed with patient, outlined below.    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    Allergic rhinitis, seasonal    ACP (advance care planning)    Subclinical hypothyroidism    Elevated fasting glucose    Mixed hyperlipidemia    Menopausal syndrome (hot flashes)     Past Medical History:   Diagnosis Date    DCIS (ductal carcinoma in situ) 03/01/2012    lt breast     Elevated LFTs 11/19/2020    Pneumonia due to infectious organism     early 2000s    PONV (postoperative nausea and vomiting)      Past Surgical History:   Procedure Laterality Date    BIOPSY BREAST NEEDLE LOCALIZATION  03/16/2012    Procedure:BIOPSY BREAST NEEDLE LOCALIZATION; Left Breast Wire Localized Excisional biopsy; Surgeon:BEN RAWLS; Location:[facility] OR    BREAST BIOPSY, RT/LT      left    COLONOSCOPY N/A 08/28/2017    Procedure: COMBINED COLONOSCOPY, SINGLE OR MULTIPLE BIOPSY/POLYPECTOMY BY BIOPSY;  colonoscopy with polypectomy by cold jumbo forcemarcy;  Surgeon: Tiera Vazquez MD;  Location: [facility]    LUMPECTOMY BREAST Left 2012    ROTATOR CUFF REPAIR RT/LT Right 11/2021     Family History   Problem Relation Age of Onset    Diabetes Type 2  Mother     Macular Degeneration Mother     Heart Failure Father     Hyperlipidemia Father     Cardiac Sudden Death Father 85    Hyperlipidemia Brother     Prostate Cancer Brother 60    No Known Problems Brother     No Known Problems Brother     Parkinsonism Maternal Grandmother     Asthma Daughter     Sleep Apnea Daughter     Colon Cancer No family hx of     Ovarian Cancer No family hx of     Lung Cancer No family hx of     Breast  "Cancer No family hx of      Social History     Tobacco Use    Smoking status: Never    Smokeless tobacco: Never   Substance Use Topics    Alcohol use: No     Diet: regular, low salt/low fat  Physical Activity: walking, balance class at [facility], exercises 3-4x/week, 2 miles/day walking  Depression Screen:    Over the past 2 weeks, patient has felt down, depressed, or hopeless:  No    Over the past 2 weeks, patient has felt little interest or pleasure in doing things: No    Functional ability/Safety screen:  Up and go test (able to get up and walk longer than 30 seconds): Passed  Patient needs assistance with: nothing. Fully independent  Patient's home has the following possible safety concerns: none identified  Patient has concerns about her hearing:  No    Cognitive Screen  Patient repeats three objects (ball, flag, tree)      Clock drawing test:   NORMAL  Recalls three objects after 3 minutes (ball,flag,tree):                                                                                               recalls 2 objects (2 points)    Physical Exam:  /82 (BP Location: Right arm, Patient Position: Sitting, Cuff Size: Adult Regular)   Pulse 77   Temp 97  F (36.1  C) (Temporal)   Ht 1.562 m (5' 1.5\")   Wt 63.1 kg (139 lb 3.2 oz)   LMP 12/10/2011   SpO2 99%   BMI 25.88 kg/m     Body mass index is 25.88 kg/m .     See physical exam in provider note below.     End of Life Planning:   Patient currently has an advanced directive: Patient has an advanced directive and will bring a copy to the clinic.     Education/Counseling:   Based on review of the above information, the following items were addressed:   -Overweight: encouraged healthy eating habits and daily exercise.     Appropriate preventive services were discussed with this patient, including applicable screening as appropriate for cardiovascular disease, diabetes, osteopenia/osteoporosis, and glaucoma.  As appropriate for age/gender, discussed screening for " colorectal cancer, prostate cancer, breast cancer, and cervical cancer.   Checklist reviewing preventive services available has been given to the patient.

## 2024-11-26 LAB — TSH SERPL-ACNC: 3.58 MIU/L (ref 0.4–4.5)

## 2025-01-22 ENCOUNTER — HOSPITAL ENCOUNTER (OUTPATIENT)
Dept: MAMMOGRAPHY | Facility: CLINIC | Age: 70
Discharge: HOME | End: 2025-01-22
Payer: MEDICARE

## 2025-01-22 DIAGNOSIS — Z12.31 VISIT FOR SCREENING MAMMOGRAM: ICD-10-CM

## 2025-01-22 PROCEDURE — 77063 BREAST TOMOSYNTHESIS BI: CPT

## 2025-01-22 PROCEDURE — 77067 SCR MAMMO BI INCL CAD: CPT

## 2025-02-20 DIAGNOSIS — E78.2 MIXED HYPERLIPIDEMIA: Primary | ICD-10-CM

## 2025-02-20 LAB
ALBUMIN SERPL-MCNC: 4.2 G/DL (ref 3.6–5.1)
ALP SERPL-CCNC: 79 U/L (ref 33–130)
ALT 1742-6: 44 U/L (ref 0–32)
AST 1920-8: 12 U/L (ref 0–35)
BILIRUB SERPL-MCNC: 0.6 MG/DL (ref 0.2–1.2)
BUN SERPL-MCNC: 16 MG/DL (ref 7–25)
BUN/CREATININE RATIO: 18 (ref 6–32)
CALCIUM SERPL-MCNC: 9.6 MG/DL (ref 8.6–10.3)
CHLORIDE SERPLBLD-SCNC: 107.1 MMOL/L (ref 98–110)
CHOLEST SERPL-MCNC: 170 MG/DL (ref 0–199)
CHOLEST/HDLC SERPL: 3 {RATIO} (ref 0–5)
CO2 SERPL-SCNC: 29.5 MMOL/L (ref 20–32)
CREAT SERPL-MCNC: 0.88 MG/DL (ref 0.6–1.3)
GLUCOSE SERPL-MCNC: 104 MG/DL (ref 60–99)
HDLC SERPL-MCNC: 55 MG/DL (ref 40–150)
LDLC SERPL CALC-MCNC: 66 MG/DL (ref 0–129)
POTASSIUM SERPL-SCNC: 5.11 MMOL/L (ref 3.5–5.3)
PROT SERPL-MCNC: 7.1 G/DL (ref 6.1–8.1)
SODIUM SERPL-SCNC: 140.1 MMOL/L (ref 135–146)
TRIGL SERPL-MCNC: 244 MG/DL (ref 0–149)

## 2025-02-20 PROCEDURE — 80061 LIPID PANEL: CPT

## 2025-02-20 PROCEDURE — 36415 COLL VENOUS BLD VENIPUNCTURE: CPT

## 2025-02-20 PROCEDURE — 80053 COMPREHEN METABOLIC PANEL: CPT

## 2025-02-20 RX ORDER — ATORVASTATIN CALCIUM 10 MG/1
10 TABLET, FILM COATED ORAL EVERY EVENING
Qty: 90 TABLET | Refills: 2 | Status: SHIPPED | OUTPATIENT
Start: 2025-02-20

## (undated) DEVICE — FCP BIOPSY RADIAL JAW 4 JUMBO 3.2MM CHANNEL M00513370

## (undated) DEVICE — KIT ENDO TURNOVER/PROCEDURE W/CLEAN A SCOPE LINERS 103888

## (undated) RX ORDER — FENTANYL CITRATE 50 UG/ML
INJECTION, SOLUTION INTRAMUSCULAR; INTRAVENOUS
Status: DISPENSED
Start: 2017-08-28

## (undated) RX ORDER — ONDANSETRON 2 MG/ML
INJECTION INTRAMUSCULAR; INTRAVENOUS
Status: DISPENSED
Start: 2017-08-28